# Patient Record
Sex: FEMALE | Race: WHITE | Employment: FULL TIME | URBAN - NONMETROPOLITAN AREA
[De-identification: names, ages, dates, MRNs, and addresses within clinical notes are randomized per-mention and may not be internally consistent; named-entity substitution may affect disease eponyms.]

---

## 2017-03-01 ENCOUNTER — OFFICE VISIT (OUTPATIENT)
Dept: FAMILY MEDICINE CLINIC | Facility: CLINIC | Age: 47
End: 2017-03-01

## 2017-03-01 VITALS
BODY MASS INDEX: 21 KG/M2 | OXYGEN SATURATION: 99 % | WEIGHT: 117 LBS | HEART RATE: 68 BPM | SYSTOLIC BLOOD PRESSURE: 118 MMHG | DIASTOLIC BLOOD PRESSURE: 60 MMHG | TEMPERATURE: 98 F

## 2017-03-01 DIAGNOSIS — J04.10 TRACHEITIS: ICD-10-CM

## 2017-03-01 DIAGNOSIS — J01.00 ACUTE NON-RECURRENT MAXILLARY SINUSITIS: Primary | ICD-10-CM

## 2017-03-01 DIAGNOSIS — H69.83 ETD (EUSTACHIAN TUBE DYSFUNCTION), BILATERAL: ICD-10-CM

## 2017-03-01 PROCEDURE — 99213 OFFICE O/P EST LOW 20 MIN: CPT | Performed by: FAMILY MEDICINE

## 2017-03-01 RX ORDER — AMOXICILLIN AND CLAVULANATE POTASSIUM 875; 125 MG/1; MG/1
1 TABLET, FILM COATED ORAL 2 TIMES DAILY
Qty: 20 TABLET | Refills: 0 | Status: SHIPPED | OUTPATIENT
Start: 2017-03-01 | End: 2017-03-11

## 2017-03-01 RX ORDER — PREDNISONE 20 MG/1
20 TABLET ORAL 2 TIMES DAILY
Qty: 10 TABLET | Refills: 0 | Status: SHIPPED | OUTPATIENT
Start: 2017-03-01 | End: 2017-03-06

## 2017-03-01 RX ORDER — BENZONATATE 200 MG/1
200 CAPSULE ORAL 3 TIMES DAILY PRN
Qty: 15 CAPSULE | Refills: 0 | Status: SHIPPED | OUTPATIENT
Start: 2017-03-01 | End: 2017-05-23 | Stop reason: ALTCHOICE

## 2017-03-01 NOTE — PROGRESS NOTES
HPI:   Manjeet Segura is a 55year old female who presents for upper respiratory symptoms for  5  days. Patient reports sore throat, clear colored nasal discharge, dry cough, cough is keeping pt up at night, denies fever.       No current outpatient prescr Visit:  Signed Prescriptions Disp Refills    predniSONE 20 MG Oral Tab 10 tablet 0      Sig: Take 1 tablet (20 mg total) by mouth 2 (two) times daily.       Amoxicillin-Pot Clavulanate 875-125 MG Oral Tab 20 tablet 0      Sig: Take 1 tablet by mouth 2 (two)

## 2017-03-01 NOTE — PATIENT INSTRUCTIONS
Rest, push fluids, nasal saline ad angel.   Prednisone 20 mg twice daily ×5 days  Augmentin 875 mg twice daily ×10 days with food, side effects discussed  May use Tessalon 200 mg every 8 as needed for refractory cough

## 2017-05-23 ENCOUNTER — OFFICE VISIT (OUTPATIENT)
Dept: FAMILY MEDICINE CLINIC | Facility: CLINIC | Age: 47
End: 2017-05-23

## 2017-05-23 VITALS
BODY MASS INDEX: 21.76 KG/M2 | SYSTOLIC BLOOD PRESSURE: 110 MMHG | TEMPERATURE: 98 F | HEART RATE: 81 BPM | HEIGHT: 62 IN | OXYGEN SATURATION: 99 % | WEIGHT: 118.25 LBS | DIASTOLIC BLOOD PRESSURE: 70 MMHG

## 2017-05-23 DIAGNOSIS — J20.9 BRONCHITIS WITH BRONCHOSPASM: ICD-10-CM

## 2017-05-23 DIAGNOSIS — J01.00 ACUTE NON-RECURRENT MAXILLARY SINUSITIS: Primary | ICD-10-CM

## 2017-05-23 PROCEDURE — 99214 OFFICE O/P EST MOD 30 MIN: CPT | Performed by: FAMILY MEDICINE

## 2017-05-23 RX ORDER — CODEINE PHOSPHATE AND GUAIFENESIN 10; 100 MG/5ML; MG/5ML
SOLUTION ORAL
Qty: 120 ML | Refills: 0 | Status: SHIPPED | OUTPATIENT
Start: 2017-05-23 | End: 2020-04-14 | Stop reason: ALTCHOICE

## 2017-05-23 RX ORDER — BUDESONIDE AND FORMOTEROL FUMARATE DIHYDRATE 160; 4.5 UG/1; UG/1
2 AEROSOL RESPIRATORY (INHALATION) 2 TIMES DAILY
Qty: 1 INHALER | Refills: 0 | COMMUNITY
Start: 2017-05-23 | End: 2020-04-14 | Stop reason: ALTCHOICE

## 2017-05-23 RX ORDER — AZITHROMYCIN 250 MG/1
TABLET, FILM COATED ORAL
Qty: 6 TABLET | Refills: 0 | Status: SHIPPED | OUTPATIENT
Start: 2017-05-23 | End: 2020-04-14 | Stop reason: ALTCHOICE

## 2017-05-23 NOTE — PROGRESS NOTES
Yamilex Baldwin is a 55year old female. Patient presents with: Other: cough-started on 5/18, not sleeping, pain in chest.... has been taking otc robitussin, nto helping, headache. ...room 1      HPI:   Patient is a one-week history of increased cough, con symptomatically. Rest, fluids and Tylenol. Discussed danger signs including increased fever,chills, SOB and need to call if arise. RTC in 24-48 hrs if no improvement or anytime PRN.   Explained majority of upper respiratory infections are viral. Viral in

## 2017-08-30 ENCOUNTER — PATIENT OUTREACH (OUTPATIENT)
Dept: FAMILY MEDICINE CLINIC | Facility: CLINIC | Age: 47
End: 2017-08-30

## 2019-01-07 ENCOUNTER — TELEPHONE (OUTPATIENT)
Dept: FAMILY MEDICINE CLINIC | Facility: CLINIC | Age: 49
End: 2019-01-07

## 2019-01-07 ENCOUNTER — HOSPITAL ENCOUNTER (OUTPATIENT)
Age: 49
Discharge: HOME | End: 2019-01-07
Payer: COMMERCIAL

## 2019-01-07 ENCOUNTER — OFFICE VISIT (OUTPATIENT)
Dept: FAMILY MEDICINE CLINIC | Facility: CLINIC | Age: 49
End: 2019-01-07

## 2019-01-07 VITALS
TEMPERATURE: 99 F | OXYGEN SATURATION: 98 % | HEART RATE: 83 BPM | SYSTOLIC BLOOD PRESSURE: 128 MMHG | RESPIRATION RATE: 16 BRPM | DIASTOLIC BLOOD PRESSURE: 68 MMHG

## 2019-01-07 DIAGNOSIS — Z02.9 ENCOUNTER FOR ADMINISTRATIVE EXAMINATIONS: Primary | ICD-10-CM

## 2019-01-07 DIAGNOSIS — S46.819A STRAIN OF TRAPEZIUS MUSCLE, UNSPECIFIED LATERALITY, INITIAL ENCOUNTER: Primary | ICD-10-CM

## 2019-01-07 PROCEDURE — 99213 OFFICE O/P EST LOW 20 MIN: CPT

## 2019-01-07 PROCEDURE — 99204 OFFICE O/P NEW MOD 45 MIN: CPT

## 2019-01-07 RX ORDER — CYCLOBENZAPRINE HCL 10 MG
10 TABLET ORAL 3 TIMES DAILY PRN
Qty: 20 TABLET | Refills: 0 | Status: SHIPPED | OUTPATIENT
Start: 2019-01-07 | End: 2019-01-14

## 2019-01-07 RX ORDER — NAPROXEN 500 MG/1
500 TABLET ORAL 2 TIMES DAILY PRN
Qty: 20 TABLET | Refills: 0 | Status: SHIPPED | OUTPATIENT
Start: 2019-01-07 | End: 2019-01-14

## 2019-01-07 NOTE — TELEPHONE ENCOUNTER
[address] [facility]. HAS BEEN HAVING BACK AND NECK SPASMS SINCE FRIDAY. SHE SAID SHE CAN'T TAKE THE PAIN ANYMORE.

## 2019-01-07 NOTE — PROGRESS NOTES
Pt presented with back and neck pain, spasms and tingling in fingers. Left work. Pain is unbearable. Has been going on since Friday. Accompanied by: self  After triage, a higher acuity of care was recommended to pt.   Discussed limitations of WI and need

## 2019-01-07 NOTE — ED PROVIDER NOTES
Patient Seen in: [address] [facility]    History   Patient presents with:  Neck Pain (musculoskeletal, neurologic)    Stated Complaint: neck and back pain/fingers tingling    70-year-old female who presents to immediate care with complaints of and vital signs reviewed. All other systems reviewed and negative except as noted above.     Physical Exam     ED Triage Vitals [01/07/19 1448]   /68   Pulse 83   Resp 16   Temp 98.5 °F (36.9 °C)   Temp src Temporal   SpO2 98 %   O2 Device including information regarding today's visit and indications prompting immediate return and appropriate follow-up was given in writing.   Patient and/or family agrees to return for any concerns, problems or complications as discussed and voiced understandi

## 2019-01-07 NOTE — TELEPHONE ENCOUNTER
Patient said that she just got out of the walk in Clinic and she is in so much pain they sent her to [address], [facility] [address] in [place].

## 2019-03-15 ENCOUNTER — PATIENT OUTREACH (OUTPATIENT)
Dept: FAMILY MEDICINE CLINIC | Facility: CLINIC | Age: 49
End: 2019-03-15

## 2019-05-21 ENCOUNTER — TELEPHONE (OUTPATIENT)
Dept: FAMILY MEDICINE CLINIC | Facility: CLINIC | Age: 49
End: 2019-05-21

## 2020-04-13 ENCOUNTER — TELEPHONE (OUTPATIENT)
Dept: FAMILY MEDICINE CLINIC | Facility: CLINIC | Age: 50
End: 2020-04-13

## 2020-04-13 NOTE — TELEPHONE ENCOUNTER
She needs a video visit or at the very least a telephone visit. Aek her what time after 1 she wants to discuss and put her in the schedule.

## 2020-04-13 NOTE — TELEPHONE ENCOUNTER
Patient agrees to a phone visit with Dr Scott Chavez and is aware her insurance will be billed.  Scheduled at 315pm

## 2020-04-13 NOTE — TELEPHONE ENCOUNTER
Was in ER because she hurt herself at work. She had ct scan done. Abnormalities in cervical vertebra, ER  has concern of cancer. Pt Wants to be seen in office by a doctor. She is off today and tomorrow. Wants to be seen this week.  I told her I didn't kn

## 2020-04-13 NOTE — TELEPHONE ENCOUNTER
Pt. Calling to schedule a ER f/u, pt. Was seen at [facility] last Friday. Pt. Refused video visit and did not want to sign up for My Chart. I did inform pt. There would be a charge for the phone visit.  She thinks she needs to come in the office to get f/

## 2020-04-14 ENCOUNTER — TELEPHONE (OUTPATIENT)
Dept: FAMILY MEDICINE CLINIC | Facility: CLINIC | Age: 50
End: 2020-04-14

## 2020-04-14 ENCOUNTER — OFFICE VISIT (OUTPATIENT)
Dept: FAMILY MEDICINE CLINIC | Facility: CLINIC | Age: 50
End: 2020-04-14
Payer: COMMERCIAL

## 2020-04-14 VITALS
TEMPERATURE: 99 F | OXYGEN SATURATION: 97 % | HEIGHT: 62 IN | WEIGHT: 131 LBS | BODY MASS INDEX: 24.11 KG/M2 | DIASTOLIC BLOOD PRESSURE: 76 MMHG | HEART RATE: 76 BPM | SYSTOLIC BLOOD PRESSURE: 116 MMHG

## 2020-04-14 DIAGNOSIS — G89.29 CHRONIC BILATERAL LOW BACK PAIN WITHOUT SCIATICA: ICD-10-CM

## 2020-04-14 DIAGNOSIS — S16.1XXD STRAIN OF NECK MUSCLE, SUBSEQUENT ENCOUNTER: Primary | ICD-10-CM

## 2020-04-14 DIAGNOSIS — R93.89 ABNORMAL CT SCAN, NECK: ICD-10-CM

## 2020-04-14 DIAGNOSIS — Z12.31 ENCOUNTER FOR SCREENING MAMMOGRAM FOR BREAST CANCER: ICD-10-CM

## 2020-04-14 DIAGNOSIS — M54.50 CHRONIC BILATERAL LOW BACK PAIN WITHOUT SCIATICA: ICD-10-CM

## 2020-04-14 PROCEDURE — 99214 OFFICE O/P EST MOD 30 MIN: CPT | Performed by: FAMILY MEDICINE

## 2020-04-14 PROCEDURE — 80053 COMPREHEN METABOLIC PANEL: CPT | Performed by: FAMILY MEDICINE

## 2020-04-14 PROCEDURE — 85025 COMPLETE CBC W/AUTO DIFF WBC: CPT | Performed by: FAMILY MEDICINE

## 2020-04-14 PROCEDURE — 86140 C-REACTIVE PROTEIN: CPT | Performed by: FAMILY MEDICINE

## 2020-04-14 RX ORDER — TRAMADOL HYDROCHLORIDE 50 MG/1
50 TABLET ORAL EVERY 8 HOURS PRN
Qty: 15 TABLET | Refills: 0 | Status: SHIPPED | OUTPATIENT
Start: 2020-04-14 | End: 2020-06-04 | Stop reason: ALTCHOICE

## 2020-04-14 RX ORDER — GABAPENTIN 300 MG/1
300 CAPSULE ORAL
COMMUNITY
Start: 2020-04-10 | End: 2020-04-20

## 2020-04-14 NOTE — PROGRESS NOTES
HPI:    Patient ID: Ellen Looney is a 52year old female. Patient presents with:  ER F/U: neck pain, abnormal CT scan    Patient seen evaluation in hospital emergency room on 4/10/2020 for a cervical strain as well as some low back pain.   A CT scan o Neurological: Positive for headaches ( tension type). Psychiatric/Behavioral: Positive for sleep disturbance ( due to pain).         Current Outpatient Medications   Medication Sig Dispense Refill   • traMADol HCl 50 MG Oral Tab Take 1 tablet (50 mg total Achilles reflexes are 2+ on the right side and 2+ on the left side. Skin: Skin is warm and dry. Psychiatric: She has a normal mood and affect.    Blood pressure 116/76, pulse 76, temperature 98.6 °F (37 °C), temperature source Oral, height 62\", gilberto

## 2020-04-14 NOTE — TELEPHONE ENCOUNTER
Reece Abarca requested to switch providers to Dr. Maryan Mackenzie per Dr. Pam Bass.  Tried calling Reece Abarca twice today to inform her and ask her if she would like to schedule a phone visit-no answer

## 2020-04-14 NOTE — TELEPHONE ENCOUNTER
See below.  said you have a 1pm phone visit with this pt, so I wanted you to have a chance to review phone note.

## 2020-04-14 NOTE — PATIENT INSTRUCTIONS
I reviewed emergency room records as well as imaging reports  I advised patient that it appears her neck pain is related to a cervical strain however further investigation of the cervical spine lucencies is needed.   Would recommend moist heat up to 15 suleman

## 2020-04-15 ENCOUNTER — TELEPHONE (OUTPATIENT)
Dept: FAMILY MEDICINE CLINIC | Facility: CLINIC | Age: 50
End: 2020-04-15

## 2020-04-15 DIAGNOSIS — E87.6 HYPOKALEMIA: Primary | ICD-10-CM

## 2020-04-17 ENCOUNTER — TELEPHONE (OUTPATIENT)
Dept: FAMILY MEDICINE CLINIC | Facility: CLINIC | Age: 50
End: 2020-04-17

## 2020-04-17 DIAGNOSIS — R93.7 ABNORMAL COMPUTED TOMOGRAPHY OF CERVICAL SPINE: Primary | ICD-10-CM

## 2020-04-17 NOTE — TELEPHONE ENCOUNTER
Her insurance only approved a whole body bone scan, this test helps identify problems of bone metabolism and it does not necessarily reveal the reason. It tells you there is a problem and where.   It will  arthritis, AVN, bone cancer, metastatic can

## 2020-04-17 NOTE — TELEPHONE ENCOUNTER
Dr. Kenzie Deluna,    Dr. Julio Araya saw this pt on 4/14. I have been watching for her PET scan referral that you placed on 4/13 to be approved. We were notified today that it was DENIED.   (referral # [id number])    The referral dept gave us this info from the insura

## 2020-04-24 ENCOUNTER — TELEPHONE (OUTPATIENT)
Dept: FAMILY MEDICINE CLINIC | Facility: CLINIC | Age: 50
End: 2020-04-24

## 2020-04-24 DIAGNOSIS — M54.2 NECK PAIN, ACUTE: Primary | ICD-10-CM

## 2020-04-24 RX ORDER — METHOCARBAMOL 500 MG/1
500 TABLET, FILM COATED ORAL 3 TIMES DAILY
Qty: 21 TABLET | Refills: 0 | Status: SHIPPED | OUTPATIENT
Start: 2020-04-24 | End: 2020-05-01

## 2020-04-24 RX ORDER — HYDROCODONE BITARTRATE AND ACETAMINOPHEN 5; 325 MG/1; MG/1
1 TABLET ORAL NIGHTLY PRN
Qty: 7 TABLET | Refills: 0 | Status: SHIPPED | OUTPATIENT
Start: 2020-04-24 | End: 2020-06-04 | Stop reason: ALTCHOICE

## 2020-04-24 NOTE — TELEPHONE ENCOUNTER
Pt's CT has not been approved yet. Pt states tramadol makes her very nauseous and is not working. Patient has a lot of pain in her neck still and doesn't know what to do. Please advise.

## 2020-04-24 NOTE — TELEPHONE ENCOUNTER
The insurance did not approve the CT PET scan, a triple phase bone scan was ordered, and to my knowledge has been approved. Please assist patient in scheduling  Would recommend physical therapy will place order.   Please advise patient that the therapist m

## 2020-04-24 NOTE — TELEPHONE ENCOUNTER
Wants to know if her insurance approved CT? Also Cecilia Cyr states her pain meds giving her nausea and not working.    Would like a call back form the nurse

## 2020-04-24 NOTE — TELEPHONE ENCOUNTER
Pt's bone scan has been approved. Left message with patient providing the central scheduling number. Order and auth sent to central scheduling.

## 2020-04-30 ENCOUNTER — TELEPHONE (OUTPATIENT)
Dept: FAMILY MEDICINE CLINIC | Facility: CLINIC | Age: 50
End: 2020-04-30

## 2020-04-30 ENCOUNTER — LAB ENCOUNTER (OUTPATIENT)
Dept: LAB | Facility: HOSPITAL | Age: 50
End: 2020-04-30
Attending: FAMILY MEDICINE
Payer: COMMERCIAL

## 2020-04-30 ENCOUNTER — VIRTUAL PHONE E/M (OUTPATIENT)
Dept: FAMILY MEDICINE CLINIC | Facility: CLINIC | Age: 50
End: 2020-04-30
Payer: COMMERCIAL

## 2020-04-30 DIAGNOSIS — Z20.822 PERSON UNDER INVESTIGATION FOR COVID-19: Primary | ICD-10-CM

## 2020-04-30 DIAGNOSIS — J02.9 PHARYNGITIS WITH VIRAL SYNDROME: ICD-10-CM

## 2020-04-30 DIAGNOSIS — B34.9 PHARYNGITIS WITH VIRAL SYNDROME: ICD-10-CM

## 2020-04-30 DIAGNOSIS — Z20.822 PERSON UNDER INVESTIGATION FOR COVID-19: ICD-10-CM

## 2020-04-30 PROCEDURE — 99213 OFFICE O/P EST LOW 20 MIN: CPT | Performed by: FAMILY MEDICINE

## 2020-04-30 NOTE — PROGRESS NOTES
Virtual Telephone Check-In    Felix Stafford verbally consents to a Virtual/Telephone Check-In visit on 04/30/20. Patient understands and accepts financial responsibility for any deductible, co-insurance and/or co-pays associated with this service.   Lopez Langley quarantine for at least 7 days following the onset of symptoms or the longer of 3 days after complete resolution of symptoms. I discussed signs and symptoms for progressive illness and management strategies.   I discussed infection control measures as well

## 2020-04-30 NOTE — TELEPHONE ENCOUNTER
Virtual/Telephone Check-In    Jocy Rajput verbally {consents to a [employer] on 04/30/20. Patient understands and accepts financial responsibility for any deductible, co-insurance and/or co-pays associated with this service.   4

## 2020-05-03 ENCOUNTER — TELEPHONE (OUTPATIENT)
Dept: FAMILY MEDICINE CLINIC | Facility: CLINIC | Age: 50
End: 2020-05-03

## 2020-05-03 NOTE — TELEPHONE ENCOUNTER
I reached out to [place] to see how she was feeling. Left a message stating that if she needs anything today she can reach the on-call physician.  I do not have access to the  at our office but she could send a [employer] message and I would be able to see beata

## 2020-05-04 RX ORDER — ALBUTEROL SULFATE 90 UG/1
2 AEROSOL, METERED RESPIRATORY (INHALATION) EVERY 4 HOURS PRN
Qty: 1 INHALER | Refills: 0 | Status: SHIPPED | OUTPATIENT
Start: 2020-05-04 | End: 2020-06-04 | Stop reason: ALTCHOICE

## 2020-05-04 NOTE — TELEPHONE ENCOUNTER
Follow-up call to patient following testing positive for COVID 19 and 4/30/2020. Patient states that she has been afebrile but has developed diarrhea last night with abdominal cramping.   She has a headache some mild chest tightness and a nonproductive cou

## 2020-05-06 ENCOUNTER — TELEPHONE (OUTPATIENT)
Dept: FAMILY MEDICINE CLINIC | Facility: CLINIC | Age: 50
End: 2020-05-06

## 2020-05-06 RX ORDER — AZITHROMYCIN 250 MG/1
TABLET, FILM COATED ORAL
Qty: 6 TABLET | Refills: 0 | Status: SHIPPED | OUTPATIENT
Start: 2020-05-06 | End: 2020-05-11

## 2020-05-06 NOTE — TELEPHONE ENCOUNTER
I called patient to follow-up on her COVID-19 infection. She developed symptoms on 4/26 and tested positive on 4/30. Follow-up 2 days ago noted diarrhea and a dry cough. She denies any recent fever but has had a dry cough, albuterol seems to help.   She

## 2020-05-14 ENCOUNTER — PATIENT MESSAGE (OUTPATIENT)
Dept: FAMILY MEDICINE CLINIC | Facility: CLINIC | Age: 50
End: 2020-05-14

## 2020-05-14 ENCOUNTER — VIRTUAL PHONE E/M (OUTPATIENT)
Dept: FAMILY MEDICINE CLINIC | Facility: CLINIC | Age: 50
End: 2020-05-14
Payer: COMMERCIAL

## 2020-05-14 ENCOUNTER — TELEPHONE (OUTPATIENT)
Dept: FAMILY MEDICINE CLINIC | Facility: CLINIC | Age: 50
End: 2020-05-14

## 2020-05-14 DIAGNOSIS — U07.1 COVID-19 VIRUS INFECTION: Primary | ICD-10-CM

## 2020-05-14 PROCEDURE — 99213 OFFICE O/P EST LOW 20 MIN: CPT | Performed by: FAMILY MEDICINE

## 2020-05-14 NOTE — PROGRESS NOTES
Virtual Telephone Check-In    Alexus Ansari verbally consents to a Virtual/Telephone Check-In visit on 05/14/20. Patient understands and accepts financial responsibility for any deductible, co-insurance and/or co-pays associated with this service.   Floreen Babinski of convalescent plasma in the future. A [employer] message with a link for participation will be forwarded. Sarah Rzao.  Christi Mishra

## 2020-05-14 NOTE — TELEPHONE ENCOUNTER
Virtual/Telephone Check-In    Jocy Rajput verbally {consents to a [employer] on 05/14/20. Patient understands and accepts financial responsibility for any deductible, co-insurance and/or co-pays associated with this service.

## 2020-05-28 ENCOUNTER — HOSPITAL ENCOUNTER (OUTPATIENT)
Dept: NUCLEAR MEDICINE | Facility: HOSPITAL | Age: 50
Discharge: HOME | End: 2020-05-28
Attending: INTERNAL MEDICINE
Payer: COMMERCIAL

## 2020-05-28 DIAGNOSIS — R93.7 ABNORMAL COMPUTED TOMOGRAPHY OF CERVICAL SPINE: ICD-10-CM

## 2020-05-28 PROCEDURE — 78306 BONE IMAGING WHOLE BODY: CPT | Performed by: INTERNAL MEDICINE

## 2020-05-28 PROCEDURE — 78832 RP LOCLZJ TUM SPECT W/CT 2: CPT | Performed by: INTERNAL MEDICINE

## 2020-06-04 ENCOUNTER — TELEPHONE (OUTPATIENT)
Dept: FAMILY MEDICINE CLINIC | Facility: CLINIC | Age: 50
End: 2020-06-04

## 2020-06-04 ENCOUNTER — VIRTUAL PHONE E/M (OUTPATIENT)
Dept: FAMILY MEDICINE CLINIC | Facility: CLINIC | Age: 50
End: 2020-06-04
Payer: COMMERCIAL

## 2020-06-04 DIAGNOSIS — F51.04 PSYCHOPHYSIOLOGICAL INSOMNIA: ICD-10-CM

## 2020-06-04 DIAGNOSIS — R93.7 ABNORMAL COMPUTED TOMOGRAPHY OF CERVICAL SPINE: Primary | ICD-10-CM

## 2020-06-04 DIAGNOSIS — R20.2 TINGLING OF BOTH FEET: ICD-10-CM

## 2020-06-04 PROCEDURE — 99213 OFFICE O/P EST LOW 20 MIN: CPT | Performed by: FAMILY MEDICINE

## 2020-06-04 RX ORDER — TRAZODONE HYDROCHLORIDE 50 MG/1
TABLET ORAL
Qty: 30 TABLET | Refills: 2 | Status: SHIPPED | OUTPATIENT
Start: 2020-06-04 | End: 2021-12-07

## 2020-06-04 NOTE — PROGRESS NOTES
Virtual Telephone Check-In    Darryl Meng verbally consents to a Virtual/Telephone Check-In visit on 06/04/20. Patient has been referred to the [facility] website at [email or web address] to review the yearly Consent to Treat document.   The following vis in both feet especially when she has been on her feet for prolonged period of time. She has been working long hours as a dialysis tech.   She also states yesterday she had mild tingling in one finger which is subsequently resolved    Patient also requests

## 2020-09-29 ENCOUNTER — TELEMEDICINE (OUTPATIENT)
Dept: FAMILY MEDICINE CLINIC | Facility: CLINIC | Age: 50
End: 2020-09-29
Payer: COMMERCIAL

## 2020-09-29 ENCOUNTER — TELEPHONE (OUTPATIENT)
Dept: FAMILY MEDICINE CLINIC | Facility: CLINIC | Age: 50
End: 2020-09-29

## 2020-09-29 DIAGNOSIS — Z86.16 HISTORY OF 2019 NOVEL CORONAVIRUS DISEASE (COVID-19): ICD-10-CM

## 2020-09-29 DIAGNOSIS — B34.9 VIRAL ILLNESS: Primary | ICD-10-CM

## 2020-09-29 PROCEDURE — 99213 OFFICE O/P EST LOW 20 MIN: CPT | Performed by: FAMILY MEDICINE

## 2020-09-29 NOTE — TELEPHONE ENCOUNTER
Virtual/Telephone Check-In    Danni Mata verbally {consents to a [employer] on 09/29/20. Patient has been referred to the [facility] website at [email or web address] to review the yearly Consent to Treat document.   Patient unders

## 2020-09-29 NOTE — PATIENT INSTRUCTIONS
I again reviewed signs and symptoms of COVID-19 infection, as it is been almost 5 months since her initial infection, would recommend she get retested. Order placed in the [facility] system.   Patient will call for lab appointment  I reviewed current CDC guide

## 2020-09-29 NOTE — PROGRESS NOTES
Yamilex Baldwin is a 52year old female. Telehealth outside of [facility] [address] Verbal Consent   I conducted a telehealth visit with Yamilex Baldwin today, 09/29/20, which was completed using two-way, real-time interactive audio and video communication. breath or fever.   She denies any change in sense of taste or smell or bowel movement    Current Outpatient Medications   Medication Sig Dispense Refill   • traZODone HCl 50 MG Oral Tab 25 to 50 mg nightly 30 tablet 2      Past Medical History:   Diagnosis COVID-19 testing. I suspect the earliest she will be able to return to work is 10/5/2020. The patient indicates understanding of these issues and agrees to the plan. Lore Portillo.  Flynn Munoz, BLACKFP

## 2020-09-30 ENCOUNTER — APPOINTMENT (OUTPATIENT)
Dept: LAB | Age: 50
End: 2020-09-30
Attending: FAMILY MEDICINE
Payer: COMMERCIAL

## 2020-09-30 DIAGNOSIS — Z86.16 HISTORY OF 2019 NOVEL CORONAVIRUS DISEASE (COVID-19): ICD-10-CM

## 2020-09-30 DIAGNOSIS — B34.9 VIRAL ILLNESS: ICD-10-CM

## 2021-07-23 ENCOUNTER — OFFICE VISIT (OUTPATIENT)
Dept: FAMILY MEDICINE CLINIC | Facility: CLINIC | Age: 51
End: 2021-07-23
Payer: COMMERCIAL

## 2021-07-23 VITALS
DIASTOLIC BLOOD PRESSURE: 78 MMHG | HEART RATE: 67 BPM | TEMPERATURE: 99 F | SYSTOLIC BLOOD PRESSURE: 118 MMHG | RESPIRATION RATE: 16 BRPM | WEIGHT: 121 LBS | HEIGHT: 62 IN | BODY MASS INDEX: 22.26 KG/M2 | OXYGEN SATURATION: 99 %

## 2021-07-23 DIAGNOSIS — B37.3 CANDIDA VAGINITIS: Primary | ICD-10-CM

## 2021-07-23 PROBLEM — Z86.16 HISTORY OF 2019 NOVEL CORONAVIRUS DISEASE (COVID-19): Status: ACTIVE | Noted: 2020-04-30

## 2021-07-23 PROCEDURE — 3008F BODY MASS INDEX DOCD: CPT | Performed by: FAMILY MEDICINE

## 2021-07-23 PROCEDURE — 3078F DIAST BP <80 MM HG: CPT | Performed by: FAMILY MEDICINE

## 2021-07-23 PROCEDURE — 3074F SYST BP LT 130 MM HG: CPT | Performed by: FAMILY MEDICINE

## 2021-07-23 PROCEDURE — 99213 OFFICE O/P EST LOW 20 MIN: CPT | Performed by: FAMILY MEDICINE

## 2021-07-23 RX ORDER — FLUCONAZOLE 150 MG/1
150 TABLET ORAL DAILY
Qty: 2 TABLET | Refills: 0 | Status: SHIPPED | OUTPATIENT
Start: 2021-07-23 | End: 2021-07-25

## 2021-07-23 NOTE — PROGRESS NOTES
HPI/Subjective:   Meena Smith is a 48year old female who presents for Vaginal Problem (Has had vaginal itching for 2 weeks )     Patient here for evaluation for vaginal itching for 2 weeks she notices no specific discharge neither white nor clear, she

## 2021-08-02 ENCOUNTER — TELEPHONE (OUTPATIENT)
Dept: FAMILY MEDICINE CLINIC | Facility: CLINIC | Age: 51
End: 2021-08-02

## 2021-08-02 RX ORDER — FLUCONAZOLE 100 MG/1
100 TABLET ORAL DAILY
Qty: 7 TABLET | Refills: 0 | Status: SHIPPED | OUTPATIENT
Start: 2021-08-02 | End: 2021-08-09

## 2021-08-02 NOTE — TELEPHONE ENCOUNTER
Pt saw you on 7/23/21 for Candida vaginitis. Spoke with pt. Pt states she still feeling itchiness (comes and goes). She also feels a burning sensation from time to time. Pt is still using Vagisil to help the itching with some relief.  Pt states she was

## 2021-08-16 ENCOUNTER — TELEPHONE (OUTPATIENT)
Dept: FAMILY MEDICINE CLINIC | Facility: CLINIC | Age: 51
End: 2021-08-16

## 2021-08-16 DIAGNOSIS — N76.1 SUBACUTE VAGINITIS: Primary | ICD-10-CM

## 2021-08-16 RX ORDER — METRONIDAZOLE 500 MG/1
500 TABLET ORAL 2 TIMES DAILY
Qty: 20 TABLET | Refills: 0 | Status: SHIPPED | OUTPATIENT
Start: 2021-08-16 | End: 2021-08-26

## 2021-08-16 NOTE — TELEPHONE ENCOUNTER
From: Rodriguez Barker  To: Miya Christian MD  Sent: 1/28/2019 11:01 AM CST  Subject: Visit Follow-up Question    This message is being sent by Amita Loya on behalf of Rodriguez Barker    Update on carlin:   We saw the cardiologist Thursday and he was It  s possibe she has   Bacterial vaginosis and not candida  So try    Metronidazole    See prescription below--I sent to pharmacy

## 2021-09-09 ENCOUNTER — TELEPHONE (OUTPATIENT)
Dept: FAMILY MEDICINE CLINIC | Facility: CLINIC | Age: 51
End: 2021-09-09

## 2021-09-09 NOTE — TELEPHONE ENCOUNTER
HAVING SICK SYMPTOMS, NO OPENINGS IN RESP CLINIC UNTIL MONDAY, PT NEEDS TO BE SEEN BEFORE THEN SO SHE CAN GO BACK TO WORK MONDAY, CALL PT

## 2021-09-09 NOTE — TELEPHONE ENCOUNTER
Pt states her employer tested her for Covid yesterday---she is waiting for the results. States her employer told her she should still be seen/ evaluated by a physician though.   Pt will go to [facility] or  this evening or tomorrow

## 2021-11-05 ENCOUNTER — TELEPHONE (OUTPATIENT)
Dept: FAMILY MEDICINE CLINIC | Facility: CLINIC | Age: 51
End: 2021-11-05

## 2021-11-05 NOTE — TELEPHONE ENCOUNTER
Spoke with patient who states she is having severe vaginal itching. She also has white spots that look like they are about to burst. She is extremely uncomfortable.  Spoke with Lisseth Ramsey, who states she can be seen tomorrow in office for a 40 minute

## 2021-11-06 ENCOUNTER — OFFICE VISIT (OUTPATIENT)
Dept: FAMILY MEDICINE CLINIC | Facility: CLINIC | Age: 51
End: 2021-11-06
Payer: COMMERCIAL

## 2021-11-06 VITALS
RESPIRATION RATE: 16 BRPM | OXYGEN SATURATION: 99 % | HEART RATE: 68 BPM | DIASTOLIC BLOOD PRESSURE: 68 MMHG | BODY MASS INDEX: 22.26 KG/M2 | WEIGHT: 121 LBS | HEIGHT: 62 IN | TEMPERATURE: 98 F | SYSTOLIC BLOOD PRESSURE: 102 MMHG

## 2021-11-06 DIAGNOSIS — Z01.419 ENCOUNTER FOR ANNUAL ROUTINE GYNECOLOGICAL EXAMINATION: Primary | ICD-10-CM

## 2021-11-06 DIAGNOSIS — A63.0 CONDYLOMA ACUMINATA: ICD-10-CM

## 2021-11-06 DIAGNOSIS — N84.1 CERVICAL POLYP: ICD-10-CM

## 2021-11-06 DIAGNOSIS — Z11.3 SCREENING FOR STD (SEXUALLY TRANSMITTED DISEASE): ICD-10-CM

## 2021-11-06 PROCEDURE — 87624 HPV HI-RISK TYP POOLED RSLT: CPT | Performed by: NURSE PRACTITIONER

## 2021-11-06 PROCEDURE — 3078F DIAST BP <80 MM HG: CPT | Performed by: NURSE PRACTITIONER

## 2021-11-06 PROCEDURE — 87591 N.GONORRHOEAE DNA AMP PROB: CPT | Performed by: NURSE PRACTITIONER

## 2021-11-06 PROCEDURE — 87491 CHLMYD TRACH DNA AMP PROBE: CPT | Performed by: NURSE PRACTITIONER

## 2021-11-06 PROCEDURE — 99214 OFFICE O/P EST MOD 30 MIN: CPT | Performed by: NURSE PRACTITIONER

## 2021-11-06 PROCEDURE — 3008F BODY MASS INDEX DOCD: CPT | Performed by: NURSE PRACTITIONER

## 2021-11-06 PROCEDURE — 87389 HIV-1 AG W/HIV-1&-2 AB AG IA: CPT | Performed by: NURSE PRACTITIONER

## 2021-11-06 PROCEDURE — 88175 CYTOPATH C/V AUTO FLUID REDO: CPT | Performed by: NURSE PRACTITIONER

## 2021-11-06 PROCEDURE — 3074F SYST BP LT 130 MM HG: CPT | Performed by: NURSE PRACTITIONER

## 2021-11-06 PROCEDURE — 86780 TREPONEMA PALLIDUM: CPT | Performed by: NURSE PRACTITIONER

## 2021-11-06 PROCEDURE — 87340 HEPATITIS B SURFACE AG IA: CPT | Performed by: NURSE PRACTITIONER

## 2021-11-06 NOTE — PROGRESS NOTES
HPI: HPI   Patient is here for vaginal itching. Has been present for 2-3 months. Tried antifungal treatment and it did not help. No unusual discharge, bleeding or odor. No UTI sx. No change in bowel movements. Not currently sexually active.  Last STD scr Right: Lesion present. Left: Lesion present. Vagina: Normal.      Cervix: Friability present. No discharge.       Uterus: Normal.       Adnexa: Right adnexa normal and left adnexa normal.                Comments: Flesh-colored, non-tender wart

## 2021-11-06 NOTE — PATIENT INSTRUCTIONS
Genital Warts  Genital warts are painless skin bumps in your genital area. You may have a single wart or several grouped together. They can have a flat or rough surface. Genital warts can appear on the penis, scrotum, vagina, vulva, or anus.    Genital wa virus remains in your skin. The warts may reappear. Be sure to let your healthcare provider know if there's any chance that you could be pregnant before starting treatment. You can get an HPV vaccine.  The vaccine protects against certain types of HPV beata have regular Pap test.   Follow-up care  When you first learn that you have genital warts, you may feel guilty, angry, and upset. Getting the facts helps put you back in control.  Follow up with your healthcare provider or your local public health departmen

## 2021-12-07 ENCOUNTER — OFFICE VISIT (OUTPATIENT)
Dept: OBGYN CLINIC | Facility: CLINIC | Age: 51
End: 2021-12-07
Payer: COMMERCIAL

## 2021-12-07 VITALS
SYSTOLIC BLOOD PRESSURE: 90 MMHG | BODY MASS INDEX: 23.07 KG/M2 | HEIGHT: 62 IN | DIASTOLIC BLOOD PRESSURE: 62 MMHG | WEIGHT: 125.38 LBS | HEART RATE: 80 BPM

## 2021-12-07 DIAGNOSIS — A63.0 CONDYLOMA ACUMINATA: Primary | ICD-10-CM

## 2021-12-07 DIAGNOSIS — Z23 NEED FOR VACCINATION: ICD-10-CM

## 2021-12-07 PROCEDURE — 3008F BODY MASS INDEX DOCD: CPT | Performed by: OBSTETRICS & GYNECOLOGY

## 2021-12-07 PROCEDURE — 90471 IMMUNIZATION ADMIN: CPT | Performed by: OBSTETRICS & GYNECOLOGY

## 2021-12-07 PROCEDURE — 3074F SYST BP LT 130 MM HG: CPT | Performed by: OBSTETRICS & GYNECOLOGY

## 2021-12-07 PROCEDURE — 3078F DIAST BP <80 MM HG: CPT | Performed by: OBSTETRICS & GYNECOLOGY

## 2021-12-07 PROCEDURE — 99243 OFF/OP CNSLTJ NEW/EST LOW 30: CPT | Performed by: OBSTETRICS & GYNECOLOGY

## 2021-12-07 PROCEDURE — 90686 IIV4 VACC NO PRSV 0.5 ML IM: CPT | Performed by: OBSTETRICS & GYNECOLOGY

## 2021-12-07 RX ORDER — IMIQUIMOD 12.5 MG/.25G
1 CREAM TOPICAL
Qty: 24 EACH | Refills: 2 | Status: SHIPPED | OUTPATIENT
Start: 2021-12-08 | End: 2021-12-28

## 2021-12-07 NOTE — PROGRESS NOTES
[facility]  Obstetrics and Gynecology  Consultation History & Physical    Sindy Gasmen [facility] Patient Status:  No patient class for patient encounter    10/9/1970 MRN [id number]   Location [address] [place] [address], [address] [place] [address] Drive, [address] [facility]  Attend normal   Vagina- No vaginal lesions, no discharge   Cervix- No lesions, long/closed, no cervical motion tenderness  Extremities: Non-tender, full range of motion, no clubbing, cyanosis or edema  Skin:  General inspection- no rashes, lesions or discoloratio

## 2021-12-07 NOTE — PATIENT INSTRUCTIONS
Aldara (Imiquimod) Instructions    Apply a thin layer 3 times/week on alternative days prior to bedtime and leave on skin for 6-10 hours. Remove by washing with mild soap and water.  Continue imiquimod treatment until there is total clearance of the lesion

## 2021-12-09 ENCOUNTER — TELEPHONE (OUTPATIENT)
Dept: OBGYN CLINIC | Facility: CLINIC | Age: 51
End: 2021-12-09

## 2021-12-10 ENCOUNTER — TELEPHONE (OUTPATIENT)
Dept: OBGYN CLINIC | Facility: CLINIC | Age: 51
End: 2021-12-10

## 2021-12-10 DIAGNOSIS — A63.0 CONDYLOMA ACUMINATA: Primary | ICD-10-CM

## 2021-12-10 NOTE — TELEPHONE ENCOUNTER
----- Message from Rachelle Clayton MD sent at 12/7/2021 12:55 PM CST -----  Surgeon: Dr. Rachelle Clayton    Date:     Assistant: theresa    Type of Admit/Expected Discharge Department: Outpatient/Same Day    LOS: 0    Procedure Location: Main OR    PreOp Dx: Faviola Archer

## 2021-12-10 NOTE — TELEPHONE ENCOUNTER
Surgery schedule for 1/13/22 at 230  Pre/post op   Future Appointments   Date Time Provider Denise Ramirezi   12/28/2021 11:15 AM Amisha Calzada MD [facility] [address]   1/25/2022 11:30 AM Amisha Calzada MD [facility] [address]     Orders enter

## 2021-12-28 ENCOUNTER — OFFICE VISIT (OUTPATIENT)
Dept: OBGYN CLINIC | Facility: CLINIC | Age: 51
End: 2021-12-28
Payer: COMMERCIAL

## 2021-12-28 VITALS
SYSTOLIC BLOOD PRESSURE: 108 MMHG | DIASTOLIC BLOOD PRESSURE: 70 MMHG | WEIGHT: 124 LBS | BODY MASS INDEX: 22.82 KG/M2 | HEIGHT: 62 IN

## 2021-12-28 DIAGNOSIS — N90.89 VULVAR LESION: ICD-10-CM

## 2021-12-28 DIAGNOSIS — A63.0 CONDYLOMA ACUMINATUM OF VULVA: Primary | ICD-10-CM

## 2021-12-28 DIAGNOSIS — Z12.31 VISIT FOR SCREENING MAMMOGRAM: ICD-10-CM

## 2021-12-28 PROCEDURE — 99213 OFFICE O/P EST LOW 20 MIN: CPT | Performed by: OBSTETRICS & GYNECOLOGY

## 2021-12-28 PROCEDURE — 87529 HSV DNA AMP PROBE: CPT | Performed by: OBSTETRICS & GYNECOLOGY

## 2021-12-28 PROCEDURE — 3074F SYST BP LT 130 MM HG: CPT | Performed by: OBSTETRICS & GYNECOLOGY

## 2021-12-28 PROCEDURE — 3008F BODY MASS INDEX DOCD: CPT | Performed by: OBSTETRICS & GYNECOLOGY

## 2021-12-28 PROCEDURE — 3078F DIAST BP <80 MM HG: CPT | Performed by: OBSTETRICS & GYNECOLOGY

## 2021-12-28 RX ORDER — VALACYCLOVIR HYDROCHLORIDE 500 MG/1
1000 TABLET, FILM COATED ORAL DAILY
Qty: 14 TABLET | Refills: 0 | Status: SHIPPED | OUTPATIENT
Start: 2021-12-28 | End: 2022-01-04

## 2021-12-28 NOTE — PROGRESS NOTES
Subjective:  46year old    Patient presents with: Other: preop    Pre operative consultation for treatment condyloma. Also three day history of pain and itching on left side of vulva. Review of Systems:  Pertinent items are noted in the HPI.

## 2021-12-29 ENCOUNTER — TELEPHONE (OUTPATIENT)
Dept: OBGYN CLINIC | Facility: CLINIC | Age: 51
End: 2021-12-29

## 2021-12-29 DIAGNOSIS — A60.04 HERPES SIMPLEX VULVOVAGINITIS: Primary | ICD-10-CM

## 2021-12-29 DIAGNOSIS — Z20.822 ENCOUNTER FOR PREOPERATIVE SCREENING LABORATORY TESTING FOR COVID-19 VIRUS: Primary | ICD-10-CM

## 2021-12-29 DIAGNOSIS — Z01.812 ENCOUNTER FOR PREOPERATIVE SCREENING LABORATORY TESTING FOR COVID-19 VIRUS: Primary | ICD-10-CM

## 2021-12-29 RX ORDER — VALACYCLOVIR HYDROCHLORIDE 500 MG/1
500 TABLET, FILM COATED ORAL DAILY
Qty: 90 TABLET | Refills: 3 | Status: SHIPPED | OUTPATIENT
Start: 2021-12-29

## 2021-12-29 NOTE — PROGRESS NOTES
Test result consistent with genital HSV. Patient already counseled. Recommend suppressive therapy. Rx sent.

## 2021-12-29 NOTE — TELEPHONE ENCOUNTER
Patient needs orders for covid test for upcoming surgery. Please enter and central scheduling will call the patient to get her scheduled.

## 2022-01-04 ENCOUNTER — TELEPHONE (OUTPATIENT)
Dept: OBGYN CLINIC | Facility: CLINIC | Age: 52
End: 2022-01-04

## 2022-01-04 NOTE — TELEPHONE ENCOUNTER
Patient is out of the valtrex. She is still having itching not sure if she needs a refill on it or needs something else prescribed. Has surgery scheduled for next week.

## 2022-01-04 NOTE — TELEPHONE ENCOUNTER
46year old patient complaining of continued vulvar itching. She has been using vagisil cream which helps a little bit, but it's still quite bothersome. She also needs a refill on Valtrex. Last OV date: 12/28/21 with Dr. Brandon Benjamin for pre op- surgery scheduled for 1/13/22 for treatment of condyloma  Recent Test/Labs: 12/28/21 + HSV type 2   Recommendations: advised that new rx with refills for Valtrex was sent on 12/29 so that should be available for her at her pharmacy. Will consult with Dr. Brandon Benjamin to see if there are any other recommendations for symptom management until her surgery. Patient understands that she may not receive call back until 1/5.

## 2022-01-07 ENCOUNTER — TELEPHONE (OUTPATIENT)
Dept: OBGYN CLINIC | Facility: CLINIC | Age: 52
End: 2022-01-07

## 2022-01-07 RX ORDER — IMIQUIMOD 12.5 MG/.25G
1 CREAM TOPICAL
Qty: 24 EACH | Refills: 1 | Status: SHIPPED | OUTPATIENT
Start: 2022-01-07

## 2022-01-18 NOTE — TELEPHONE ENCOUNTER
Pt called to notify that the pharmacy wont release her Imiquimod cream because its too early. (due on 1/29/22) pt states she needs it now as she is using as  directed and has run out. Can a new rx be sent to pharmacy?   Juana Brothjustice

## 2022-01-20 NOTE — TELEPHONE ENCOUNTER
Pharmacy contacted. Insurance will not release early, but patient can pay out of pocket if desired. Message left for patient.

## 2022-03-09 ENCOUNTER — TELEPHONE (OUTPATIENT)
Dept: FAMILY MEDICINE CLINIC | Facility: CLINIC | Age: 52
End: 2022-03-09

## 2022-03-09 NOTE — TELEPHONE ENCOUNTER
Left message for patient to call. Has an order for a mammogram that has  not been completed. Does she need the number for Central Scheduling?

## 2022-06-07 ENCOUNTER — TELEPHONE (OUTPATIENT)
Dept: OBGYN CLINIC | Facility: CLINIC | Age: 52
End: 2022-06-07

## 2022-06-08 NOTE — TELEPHONE ENCOUNTER
Last OV: 12/28/21 with Dr. Brandon Benjamin for pre-op for treatment of condylomas. Surgery was unable to be performed and Imiquimod started. Patient was to f/u in office in 1 month----> February 2022      Call to patient; no answer. Left message to call back.

## 2022-06-13 DIAGNOSIS — A63.0 CONDYLOMA ACUMINATUM OF VULVA: Primary | ICD-10-CM

## 2022-06-13 NOTE — TELEPHONE ENCOUNTER
Patient states her lesions are still there but are smaller  She would like to have a refill to see if it helps prior to scheduling an appointment with another provider  If the lesions do not improve, she will call to provide an update and request a referral to the provider who can do laser surgery. Dr. Robby White, okay for refill?

## 2022-06-14 RX ORDER — IMIQUIMOD 12.5 MG/.25G
CREAM TOPICAL
Qty: 24 EACH | Refills: 0 | Status: SHIPPED | OUTPATIENT
Start: 2022-06-14

## 2022-06-15 NOTE — TELEPHONE ENCOUNTER
Patient used medication for 16 weeks. Patient reports 60-70% improvement. Refill sent. Follow up 1-2 mos if not completely resolved.

## 2022-07-05 ENCOUNTER — TELEPHONE (OUTPATIENT)
Dept: OBGYN CLINIC | Facility: CLINIC | Age: 52
End: 2022-07-05

## 2022-07-05 NOTE — TELEPHONE ENCOUNTER
Contacted patient. She believes she is having an HSV outbreak. On suppression therapy and wants to know if she should increase the dose. Provided instructions for outbreak- 500 mg bid for 3 days. Patient states understanding.
Pt calling states that she is having reoccurring symptoms. Really irritated.     Pt pharmacy is [facility] in [place]
Physical therapy

## 2022-07-08 ENCOUNTER — TELEPHONE (OUTPATIENT)
Dept: OBGYN CLINIC | Facility: CLINIC | Age: 52
End: 2022-07-08

## 2022-07-08 NOTE — TELEPHONE ENCOUNTER
Spoke with patient on Tuesday. She had called about how to treat outbreak of HSV. Instructions were given. Returned call to patient. She did three days of treatment for outbreak, but feels it is not helping. She has been using vagisil cream to help with the itching, but it hasn't been enough. She's having a lot of itching and pain. Denies abnormal discharge. Will consult with Dr. Jose Villareal and call patient back.

## 2022-07-08 NOTE — TELEPHONE ENCOUNTER
Was off medication completely for one week. Took 500 mg twice daily for three days. Last dose this morning. Ok to continue 500 mg BID for an additional 2 days, then back to 500 mg daily suppressive. Can increase to 1000 mg daily suppressive if still getting outbreaks. If no resolution by next week follow up office.   If symptoms worsen, recommend urgent care

## 2022-07-08 NOTE — TELEPHONE ENCOUNTER
PT was on Valtrex before and stopped taking because she could not afford it.     She has been taking again for the past week and is not feeling any better    Please call

## 2022-09-29 ENCOUNTER — APPOINTMENT (OUTPATIENT)
Dept: MRI IMAGING | Facility: HOSPITAL | Age: 52
End: 2022-09-29
Attending: STUDENT IN AN ORGANIZED HEALTH CARE EDUCATION/TRAINING PROGRAM
Payer: COMMERCIAL

## 2022-09-29 ENCOUNTER — HOSPITAL ENCOUNTER (INPATIENT)
Facility: HOSPITAL | Age: 52
LOS: 3 days | Discharge: HOME | End: 2022-10-02
Attending: STUDENT IN AN ORGANIZED HEALTH CARE EDUCATION/TRAINING PROGRAM | Admitting: INTERNAL MEDICINE
Payer: COMMERCIAL

## 2022-09-29 ENCOUNTER — TELEPHONE (OUTPATIENT)
Dept: FAMILY MEDICINE CLINIC | Facility: CLINIC | Age: 52
End: 2022-09-29

## 2022-09-29 DIAGNOSIS — G51.0 FACIAL PARALYSIS: Primary | ICD-10-CM

## 2022-09-29 PROBLEM — D64.9 ANEMIA: Status: ACTIVE | Noted: 2022-09-29

## 2022-09-29 PROBLEM — R79.89 AZOTEMIA: Status: ACTIVE | Noted: 2022-09-29

## 2022-09-29 LAB
ALBUMIN SERPL-MCNC: 3.9 G/DL (ref 3.4–5)
ALBUMIN/GLOB SERPL: 0.9 {RATIO} (ref 1–2)
ALP LIVER SERPL-CCNC: 55 U/L
ALT SERPL-CCNC: 14 U/L
ANION GAP SERPL CALC-SCNC: 5 MMOL/L (ref 0–18)
APTT PPP: 34.7 SECONDS (ref 23.3–35.6)
AST SERPL-CCNC: 18 U/L (ref 15–37)
BASOPHILS # BLD AUTO: 0.05 X10(3) UL (ref 0–0.2)
BASOPHILS NFR BLD AUTO: 0.6 %
BILIRUB SERPL-MCNC: 0.4 MG/DL (ref 0.1–2)
BUN BLD-MCNC: 12 MG/DL (ref 7–18)
CALCIUM BLD-MCNC: 9.1 MG/DL (ref 8.5–10.1)
CHLORIDE SERPL-SCNC: 107 MMOL/L (ref 98–112)
CO2 SERPL-SCNC: 25 MMOL/L (ref 21–32)
CREAT BLD-MCNC: 0.59 MG/DL
EOSINOPHIL # BLD AUTO: 0.22 X10(3) UL (ref 0–0.7)
EOSINOPHIL NFR BLD AUTO: 2.8 %
ERYTHROCYTE [DISTWIDTH] IN BLOOD BY AUTOMATED COUNT: 15.2 %
GFR SERPLBLD BASED ON 1.73 SQ M-ARVRAT: 109 ML/MIN/1.73M2 (ref 60–?)
GLOBULIN PLAS-MCNC: 4.5 G/DL (ref 2.8–4.4)
GLUCOSE BLD-MCNC: 84 MG/DL (ref 70–99)
HCT VFR BLD AUTO: 37.3 %
HGB BLD-MCNC: 11.6 G/DL
IMM GRANULOCYTES # BLD AUTO: 0.01 X10(3) UL (ref 0–1)
IMM GRANULOCYTES NFR BLD: 0.1 %
INR BLD: 1.23 (ref 0.85–1.16)
LYMPHOCYTES # BLD AUTO: 2.27 X10(3) UL (ref 1–4)
LYMPHOCYTES NFR BLD AUTO: 28.4 %
MCH RBC QN AUTO: 28 PG (ref 26–34)
MCHC RBC AUTO-ENTMCNC: 31.1 G/DL (ref 31–37)
MCV RBC AUTO: 89.9 FL
MONOCYTES # BLD AUTO: 0.54 X10(3) UL (ref 0.1–1)
MONOCYTES NFR BLD AUTO: 6.8 %
NEUTROPHILS # BLD AUTO: 4.89 X10 (3) UL (ref 1.5–7.7)
NEUTROPHILS # BLD AUTO: 4.89 X10(3) UL (ref 1.5–7.7)
NEUTROPHILS NFR BLD AUTO: 61.3 %
OSMOLALITY SERPL CALC.SUM OF ELEC: 283 MOSM/KG (ref 275–295)
PLATELET # BLD AUTO: 262 10(3)UL (ref 150–450)
POTASSIUM SERPL-SCNC: 3.6 MMOL/L (ref 3.5–5.1)
PROT SERPL-MCNC: 8.4 G/DL (ref 6.4–8.2)
PROTHROMBIN TIME: 15.5 SECONDS (ref 11.6–14.8)
RBC # BLD AUTO: 4.15 X10(6)UL
SARS-COV-2 RNA RESP QL NAA+PROBE: NOT DETECTED
SODIUM SERPL-SCNC: 137 MMOL/L (ref 136–145)
WBC # BLD AUTO: 8 X10(3) UL (ref 4–11)

## 2022-09-29 PROCEDURE — 70546 MR ANGIOGRAPH HEAD W/O&W/DYE: CPT | Performed by: STUDENT IN AN ORGANIZED HEALTH CARE EDUCATION/TRAINING PROGRAM

## 2022-09-29 PROCEDURE — 99222 1ST HOSP IP/OBS MODERATE 55: CPT | Performed by: INTERNAL MEDICINE

## 2022-09-29 PROCEDURE — 70549 MR ANGIOGRAPH NECK W/O&W/DYE: CPT | Performed by: STUDENT IN AN ORGANIZED HEALTH CARE EDUCATION/TRAINING PROGRAM

## 2022-09-29 PROCEDURE — 70553 MRI BRAIN STEM W/O & W/DYE: CPT | Performed by: STUDENT IN AN ORGANIZED HEALTH CARE EDUCATION/TRAINING PROGRAM

## 2022-09-29 RX ORDER — ONDANSETRON 2 MG/ML
4 INJECTION INTRAMUSCULAR; INTRAVENOUS EVERY 6 HOURS PRN
Status: DISCONTINUED | OUTPATIENT
Start: 2022-09-29 | End: 2022-10-02

## 2022-09-29 RX ORDER — VALACYCLOVIR HYDROCHLORIDE 500 MG/1
500 TABLET, FILM COATED ORAL DAILY
COMMUNITY
Start: 2022-06-28 | End: 2022-10-03

## 2022-09-29 RX ORDER — VALACYCLOVIR HYDROCHLORIDE 500 MG/1
500 TABLET, FILM COATED ORAL NIGHTLY
Status: DISCONTINUED | OUTPATIENT
Start: 2022-09-29 | End: 2022-10-02

## 2022-09-29 RX ORDER — ACETAMINOPHEN 500 MG
500 TABLET ORAL EVERY 4 HOURS PRN
Status: DISCONTINUED | OUTPATIENT
Start: 2022-09-29 | End: 2022-10-01

## 2022-09-29 NOTE — TELEPHONE ENCOUNTER
Spoke with pt. States she woke up this morning and the R side of her mouth and face are swollen and numb. States her \"smile is crooked\". Also c/o h/a. Denies numbness or weakness in extremities. Reports she drove to work and they sent her home. She is currently in [place], [place] driving back to her home in [place]. Advised she needs to be evaluated in ER. Recommended [facility] since she is closer to that location right now, rather to  which is closer to her home.

## 2022-09-29 NOTE — ED INITIAL ASSESSMENT (HPI)
Patient states she went to bed around 9PM last night feeling fine. She woke up at [facility] for work and felt her face was feeling weird. Patient went to bathroom and noticed her whole right side of face was drooping and numb. Patient states her right side of her face, right side of neck and down right arm to fingers are numb and tingling. Patient in triage has NIH 1. Patients only other complaint is a headache.

## 2022-09-29 NOTE — TELEPHONE ENCOUNTER
Pt woke up this morning with the right side of her face swollen & numb. She is having problems talking. yes

## 2022-09-29 NOTE — ED QUICK NOTES
RN called MRI department. Tech there states 2 patients still ahead of this one and it will be a couple of hours.

## 2022-09-30 LAB
ANION GAP SERPL CALC-SCNC: 5 MMOL/L (ref 0–18)
BASOPHILS # BLD AUTO: 0.05 X10(3) UL (ref 0–0.2)
BASOPHILS NFR BLD AUTO: 0.7 %
BUN BLD-MCNC: 15 MG/DL (ref 7–18)
CALCIUM BLD-MCNC: 9.3 MG/DL (ref 8.5–10.1)
CHLORIDE SERPL-SCNC: 111 MMOL/L (ref 98–112)
CLARITY CSF: CLEAR
CO2 SERPL-SCNC: 24 MMOL/L (ref 21–32)
COLOR CSF: COLORLESS
COUNT PERFORMED ON TUBE: 4
CREAT BLD-MCNC: 0.6 MG/DL
EOSINOPHIL # BLD AUTO: 0.29 X10(3) UL (ref 0–0.7)
EOSINOPHIL NFR BLD AUTO: 4.3 %
ERYTHROCYTE [DISTWIDTH] IN BLOOD BY AUTOMATED COUNT: 15.4 %
GFR SERPLBLD BASED ON 1.73 SQ M-ARVRAT: 109 ML/MIN/1.73M2 (ref 60–?)
GLUCOSE BLD-MCNC: 87 MG/DL (ref 70–99)
GLUCOSE CSF-MCNC: 54 MG/DL (ref 40–70)
HCT VFR BLD AUTO: 36.7 %
HGB BLD-MCNC: 11.6 G/DL
IMM GRANULOCYTES # BLD AUTO: 0.01 X10(3) UL (ref 0–1)
IMM GRANULOCYTES NFR BLD: 0.1 %
LYMPHOCYTES # BLD AUTO: 1.92 X10(3) UL (ref 1–4)
LYMPHOCYTES NFR BLD AUTO: 28.4 %
MCH RBC QN AUTO: 28.3 PG (ref 26–34)
MCHC RBC AUTO-ENTMCNC: 31.6 G/DL (ref 31–37)
MCV RBC AUTO: 89.5 FL
MONOCYTES # BLD AUTO: 0.51 X10(3) UL (ref 0.1–1)
MONOCYTES NFR BLD AUTO: 7.6 %
NEUTROPHILS # BLD AUTO: 3.97 X10 (3) UL (ref 1.5–7.7)
NEUTROPHILS # BLD AUTO: 3.97 X10(3) UL (ref 1.5–7.7)
NEUTROPHILS NFR BLD AUTO: 58.9 %
OSMOLALITY SERPL CALC.SUM OF ELEC: 290 MOSM/KG (ref 275–295)
PLATELET # BLD AUTO: 268 10(3)UL (ref 150–450)
POTASSIUM SERPL-SCNC: 4.3 MMOL/L (ref 3.5–5.1)
PROT PATTERN CSF ELPH-IMP: 43.5 MG/DL (ref 15–45)
RBC # BLD AUTO: 4.1 X10(6)UL
RBC # CSF: 1 /MM3 (ref ?–1)
SODIUM SERPL-SCNC: 140 MMOL/L (ref 136–145)
TOTAL CELLS COUNTED CSF: 2 /MM3 (ref 0–5)
TOTAL VOLUME CSF: 8 ML
WBC # BLD AUTO: 6.8 X10(3) UL (ref 4–11)

## 2022-09-30 PROCEDURE — 99232 SBSQ HOSP IP/OBS MODERATE 35: CPT | Performed by: INTERNAL MEDICINE

## 2022-09-30 PROCEDURE — 99255 IP/OBS CONSLTJ NEW/EST HI 80: CPT | Performed by: OTHER

## 2022-09-30 PROCEDURE — 009U3ZX DRAINAGE OF SPINAL CANAL, PERCUTANEOUS APPROACH, DIAGNOSTIC: ICD-10-PCS | Performed by: OTHER

## 2022-09-30 RX ORDER — PANTOPRAZOLE SODIUM 40 MG/1
40 TABLET, DELAYED RELEASE ORAL
Status: DISCONTINUED | OUTPATIENT
Start: 2022-10-01 | End: 2022-10-02

## 2022-09-30 RX ORDER — ENOXAPARIN SODIUM 100 MG/ML
40 INJECTION SUBCUTANEOUS NIGHTLY
Status: DISCONTINUED | OUTPATIENT
Start: 2022-09-30 | End: 2022-10-02

## 2022-09-30 RX ORDER — LIDOCAINE HYDROCHLORIDE 10 MG/ML
10 INJECTION, SOLUTION INFILTRATION; PERINEURAL ONCE
Status: COMPLETED | OUTPATIENT
Start: 2022-09-30 | End: 2022-09-30

## 2022-09-30 NOTE — ED QUICK NOTES
Orders for admission, patient is aware of plan and ready to go upstairs. Any questions, please call ED CHATA Huddleston  at extension [phone]. Vaccinated? Yes  Type of COVID test sent:Rapid  COVID Suspicion level: Low      Titratable drug(s) infusing:None  Rate:    LOC at time of transport:AOx4    Other pertinent information:Patient able to move all extremeties, only symptom is right sided facial droop and some numbness on right side of face.     CIWA score=N/A  NIH score=N/A

## 2022-09-30 NOTE — PLAN OF CARE
47 y/o F. A&Ox4. VSS. NSR, on tele. Patient denies pain. Right facial droop noted. Patient denies any numbness. Up with standby assist.   Safety precautions in place. All needs met at this time, will continue to monitor.       Problem: Patient/Family Goals  Goal: Patient/Family Long Term Goal  Description: Patient's Long Term Goal: Discharge    Interventions:  - MD Consults  - Neuro eval  - NPO SLP  - PT/OT  - See additional Care Plan goals for specific interventions  Outcome: Progressing  Goal: Patient/Family Short Term Goal  Description: Patient's Short Term Goal: NPO    Interventions:   - SLP eval  - See additional Care Plan goals for specific interventions  Outcome: Progressing

## 2022-09-30 NOTE — PROGRESS NOTES
NURSING ADMISSION NOTE      Patient admitted to [facility] via Cart at 2200. Admission navigator completed. Patient is A&Ox4. Right facial droop noted. Patient c/o headache and numbness/tingling sensation on right face/neck. Neurology on consult. Afebrile. VSS. Room air, denies SOB. NSR, on tele. NPO, SLP for eval. Patient denies N/V/D. Continent. Up standby. Denies weakness/numbness to BLE. PT/OT for eval.    Oriented to room. Safety precautions initiated. Bed in low position. Call light in reach. All questions and concerns addressed. Updated on plan of care. All needs met at this time.

## 2022-09-30 NOTE — ED PROVIDER NOTES
Signout to check MRI results, as below. Discussed the results of the MRI with Dr. Taylor Diana and on reexamination the patient, paralysis does not clearly include her forehead or periorbital muscles. Discussed findings with patient, she will need evaluation for possible multiple sclerosis, would like to stay in the hospital tonight and will have further evaluation tomorrow morning along with likely LP. She is hemodynamically stable and otherwise without complaint at this time Case was endorsed to [facility] hospitalist.    MRI BRAIN MRA [facility] NECK (ALL W+WO) (CPT=70553/86594/11146)    Result Date: 9/29/2022  PROCEDURE:  MRI BRAIN MRA HEAD+MRA NECK (ALL W+WO) (CPT=70553/54535/53673)  LOCATION:  [id number]   COMPARISON:  None. INDICATIONS:  eval CVA  TECHNIQUE:  MRI of the brain was performed with multi-planar T1, T2-weighted images with FLAIR sequences and diffusion weighted images without and with infusion. MR angiography of the brain without and with infusion and MR angiography of the neck without and with infusion was performed using 3D time of flight, multi-planar and 3D reconstructed images. All measurements obtained in this exam were performed using NASCET criteria. PATIENT STATED HISTORY:(As transcribed by Technologist)  Eval CVA   CONTRAST USED:  12 mL of Dotarem  FINDINGS:  MRI BRAIN FINDINGS:   The ventricles and sulci are within normal limits. There is no midline shift or mass effect. The basal cisterns are patent. The craniocervical junction is unremarkable. Minimal cerebellar tonsillar ectopia. Corpus callosum and optic chiasm are unremarkable. There is no acute intracranial hemorrhage or extra-axial fluid collection identified. Scattered FLAIR abnormalities in the subcortical white matter are noted. This is greater than expected for the patient's age. No significant abnormal parenchymal gradient susceptibility. There is no abnormal parenchymal or leptomeningeal enhancement.   There is no restricted diffusion to suggest acute ischemia/infarction. The visualized paranasal sinuses and mastoid air cells are unremarkable. The expected major intracranial flow voids are present. MRA BRAIN The upper cervical, petrous, cavernous, and supraclinoid internal carotid arteries are unremarkable. An anterior communicating artery is seen. The branches of the anterior cerebral and middle cerebral arteries are unremarkable. Fetal origin of the right posterior cerebral artery. The branches of the posterior cerebral and superior cerebellar arteries are unremarkable. The basilar artery has a normal course and caliber. The bilateral vertebral arteries are unremarkable. The origins of the bilateral PICA are seen. MRA NECK There is a 3-vessel aortic arch. The origins of the branch vessels appear widely patent. The bilateral subclavian arteries and innominate artery are unremarkable. The common carotid arteries are widely patent. The carotid bifurcations appear unremarkable. There is no evidence of hemodynamically significant stenosis in the carotid bulbs by NASCET criteria. The cervical internal carotid arteries are widely patent. The vertebral arteries originate from the subclavian arteries. The origins of the vertebral arteries are patent. The cervical vertebral arteries are widely patent. Hypoplastic right vertebral artery. CONCLUSION:   1. No evidence of an acute infarct. 2. Numerous subcortical FLAIR abnormalities throughout the white matter is noted. This is greater than expected for the patient's age. This may be due to chronic small vessel ischemic disease that is advanced. Developing demyelinating process is a consideration. Focal hyperintensity on diffusion-weighted images in the left frontal lobe subcortical white matter (image 20) is likely due to T2 shine through. An acute infarct is not identified. 3. Unremarkable MR angiogram head examination.   4. Unremarkable MR angiogram neck examination.     Dictated by (CST): Marsha Wagner MD on 9/29/2022 at 5:36 PM     Finalized by (CST): Marsha aWgner MD on 9/29/2022 at 5:45 PM

## 2022-10-01 PROCEDURE — 99232 SBSQ HOSP IP/OBS MODERATE 35: CPT | Performed by: OTHER

## 2022-10-01 PROCEDURE — 99232 SBSQ HOSP IP/OBS MODERATE 35: CPT | Performed by: HOSPITALIST

## 2022-10-01 RX ORDER — ACETAMINOPHEN 500 MG
500 TABLET ORAL EVERY 4 HOURS PRN
Status: DISCONTINUED | OUTPATIENT
Start: 2022-10-01 | End: 2022-10-02

## 2022-10-01 RX ORDER — TRAZODONE HYDROCHLORIDE 50 MG/1
50 TABLET ORAL NIGHTLY PRN
Status: DISCONTINUED | OUTPATIENT
Start: 2022-10-01 | End: 2022-10-02

## 2022-10-01 NOTE — PLAN OF CARE
Assumed patient care at [address] [place] [address]. Patient is A&Ox4. Right facial droop noted. Neurology is following. On IV Solu-MEDROL. Afebrile. VSS. Room air, denies SOB. NSR, on tele. On regular diet. Patient denies N/V/D. Patient c/o 7/10 headache, PRN Tylenol given with relief. Patient c/o of insomnia, PRN Trazodone given. Medications were administered per protocol. Right AC PIV - SL, C/D/I. Continent. Up standby assist.  All questions and concerns addressed. Updated on plan of care. All needs met at this time. Problem: Patient/Family Goals  Goal: Patient/Family Long Term Goal  Description: Patient's Long Term Goal: Discharge    Interventions:  - MD Consults  - IV Solu-MEDROL  - pain management  - See additional Care Plan goals for specific interventions  Outcome: Progressing  Goal: Patient/Family Short Term Goal  Description: Patient's Short Term Goal: Pain management.     Interventions:   - pain medication PRN  - See additional Care Plan goals for specific interventions  Outcome: Progressing

## 2022-10-01 NOTE — PLAN OF CARE
Patient alert and oriented x4. On RA. Denies pain. R facial droop. IV solumedrol. Resting comfortably in bed. WCTM. Plan for MRI. Screening form completed. 1150:  Pt c/o new onset, L leg cramping, L arm numbness and tingling. Neuro paged. no back pain

## 2022-10-02 ENCOUNTER — APPOINTMENT (OUTPATIENT)
Dept: MRI IMAGING | Facility: HOSPITAL | Age: 52
End: 2022-10-02
Attending: Other
Payer: COMMERCIAL

## 2022-10-02 VITALS
HEART RATE: 80 BPM | TEMPERATURE: 98 F | WEIGHT: 125.63 LBS | HEIGHT: 62 IN | SYSTOLIC BLOOD PRESSURE: 113 MMHG | RESPIRATION RATE: 18 BRPM | DIASTOLIC BLOOD PRESSURE: 59 MMHG | BODY MASS INDEX: 23.12 KG/M2 | OXYGEN SATURATION: 98 %

## 2022-10-02 DIAGNOSIS — A60.04 HERPES SIMPLEX VULVOVAGINITIS: ICD-10-CM

## 2022-10-02 PROCEDURE — 72157 MRI CHEST SPINE W/O & W/DYE: CPT | Performed by: OTHER

## 2022-10-02 PROCEDURE — 72156 MRI NECK SPINE W/O & W/DYE: CPT | Performed by: OTHER

## 2022-10-02 PROCEDURE — 99239 HOSP IP/OBS DSCHRG MGMT >30: CPT | Performed by: HOSPITALIST

## 2022-10-02 PROCEDURE — 99232 SBSQ HOSP IP/OBS MODERATE 35: CPT | Performed by: OTHER

## 2022-10-02 RX ORDER — PANTOPRAZOLE SODIUM 40 MG/1
40 TABLET, DELAYED RELEASE ORAL
Qty: 15 TABLET | Refills: 0 | Status: SHIPPED | OUTPATIENT
Start: 2022-10-03

## 2022-10-02 RX ORDER — PREDNISONE 20 MG/1
TABLET ORAL
Qty: 30 TABLET | Refills: 0 | Status: SHIPPED | OUTPATIENT
Start: 2022-10-02

## 2022-10-02 NOTE — PLAN OF CARE
Assumed pt care at 0730. A&Ox4. VSS. Room air. NSR on tele. R AC PIV SL, IV Solumedrol Q12H. Denies pain, denies N/V. Very mild R facial droop still present. Voiding, up ad angel to bathroom. Regular diet. Lovenox subcutaneous for VTE prevention. MRI cervical/thoracic spine to be completed this AM. Pt updated with POC. Problem: Patient/Family Goals  Goal: Patient/Family Long Term Goal  Description: Patient's Long Term Goal: Discharge  Interventions:  - MD Consults  - IV Solu-MEDROL  - pain management  - See additional Care Plan goals for specific interventions  Outcome: Progressing  Goal: Patient/Family Short Term Goal  Description: Patient's Short Term Goal: Pain management. Interventions:   - pain medication PRN  - See additional Care Plan goals for specific interventions  Outcome: Progressing     Problem: NEUROLOGICAL - ADULT  Goal: Achieves stable or improved neurological status  Description: INTERVENTIONS  - Assess for and report changes in neurological status  - Initiate measures to prevent increased intracranial pressure  - Maintain blood pressure and fluid volume within ordered parameters to optimize cerebral perfusion and minimize risk of hemorrhage  - Monitor temperature, glucose, and sodium.  Initiate appropriate interventions as ordered  Outcome: Progressing

## 2022-10-02 NOTE — PHYSICAL THERAPY NOTE
Attempted to see pt for PT treatment. Pt politely declines reports she feels she is back to baseline and with no further skilled IP PT needs. Pt politely declines trial of stairs and has no concerns re: mobility for return home. RN confirms pt has been up ad angel in room without difficulty and ambulating. Pt likely to dc later today. Will dc from IP skilled PT services. RN aware.

## 2022-10-02 NOTE — PLAN OF CARE
NURSING DISCHARGE NOTE    Discharged Home via Wheelchair. Accompanied by Family member and Support staff  Belongings Taken by patient/family. Pt discharged in calm, stable status to home. Discharge paperwork provided & discussed, pt verbalized understanding. PIV removed. No paper prescriptions to provide.

## 2022-10-02 NOTE — PLAN OF CARE
Pt is aox4. States no pain. IV saline locked. Medicated per orders. Resting in bed. Generalized weakness. Up with standby assist. MRI ordered for Sunday. Slight right sided facial droop. Problem: Patient/Family Goals  Goal: Patient/Family Long Term Goal  Description: Patient's Long Term Goal: Discharge    Interventions:  - MD Consults  - IV Solu-MEDROL  - pain management  - See additional Care Plan goals for specific interventions  Outcome: Progressing  Goal: Patient/Family Short Term Goal  Description: Patient's Short Term Goal: Pain management.     Interventions:   - pain medication PRN  - See additional Care Plan goals for specific interventions  Outcome: Progressing

## 2022-10-03 ENCOUNTER — PATIENT OUTREACH (OUTPATIENT)
Dept: CASE MANAGEMENT | Age: 52
End: 2022-10-03

## 2022-10-03 LAB
ALBUMIN INDEX: 5.7 RATIO
ALBUMIN, CSF: 21 MG/DL
ALBUMIN, SERUM/PLASMA, NEPH: 3695 MG/DL
ANGIOTENSIN CONVERTING E, CSF: 1.4 U/L
AQUAPORIN-4 RECEPTOR ANTIBODY: <1.5 U/ML
CSF IGG SYNTHESIS RATE: <0 MG/D
CSF IGG/ALBUMIN RATIO: 0.21 RATIO
CSF OLIGOCLONAL BANDS NUMBER: 0 BANDS
CSF OLIGOCLONAL BANDS: NEGATIVE
IGG INDEX: 0.51 RATIO
IMMUNOGLOBULIN G CSF: 4.4 MG/DL
IMMUNOGLOBULIN G: 1512 MG/DL

## 2022-10-03 RX ORDER — VALACYCLOVIR HYDROCHLORIDE 500 MG/1
500 TABLET, FILM COATED ORAL DAILY
Qty: 90 TABLET | Refills: 0 | Status: SHIPPED | OUTPATIENT
Start: 2022-10-03

## 2022-10-03 RX ORDER — VALACYCLOVIR HYDROCHLORIDE 500 MG/1
TABLET, FILM COATED ORAL
Qty: 14 TABLET | Refills: 0 | OUTPATIENT
Start: 2022-10-03

## 2022-10-03 NOTE — PROGRESS NOTES
Attempted to contact pt for TCM however call rang twice and then a lady answered. Requested to speak to [facility] and no response. Waited and continued to say Hello but no response. Call was ended. Tried to call back and no answer. Unable to leave a VM at this time. NCM to try again at a later time.

## 2022-10-03 NOTE — PAYOR COMM NOTE
Discharge Notification    Patient Name: Elfrieda Sandifer: BCBS PPO  Subscriber #: [id number]  Authorization Number: Everton Velasquez Date/Time: 9/29/2022 12:11 PM  Discharge Date/Time: 10/2/2022 5:10 PM

## 2022-10-03 NOTE — TELEPHONE ENCOUNTER
Last OV: 12/28/21 with Dr. Hector Fuentes for pre-op  Last refill date: 12/29/21  Follow-up: post op  Next appt.: none scheduled    Refill sent

## 2022-10-04 ENCOUNTER — TELEPHONE (OUTPATIENT)
Dept: PHYSICAL THERAPY | Facility: HOSPITAL | Age: 52
End: 2022-10-04

## 2022-10-04 NOTE — PROGRESS NOTES
Pt called back, NCM calling back. Attempted to contact pt for TCM however no answer. Call continued to ring and did not go to a VM. Will await a returned phone call.

## 2022-10-17 NOTE — PROCEDURES
Lumbar puncture procedure Note       Indication - facial weakness and MRI changes    Local anesthesia - local 1% lidocaine w/o epi     Informed consent was obtained from the patient. The area was prepped and draped in the usual sterile fashion. Using landmarks, a 22 guage spinal needle was inserted in the L4-L5 innerspace. The stylet was removed and clear CSF fluid was seen. 12cc of clear fluid was collected and sent for routine studies. CSF was sent for oligoconal bands, cell count and diff, WBC and RBC. The patient tolerated the procedure well. There was no blood loss or hematoma.

## 2022-10-26 ENCOUNTER — TELEPHONE (OUTPATIENT)
Dept: SURGERY | Facility: CLINIC | Age: 52
End: 2022-10-26

## 2022-10-26 NOTE — TELEPHONE ENCOUNTER
Pt states has tingling and numbness on head and lips still, would like earlier appt than 1/17/2022 for HFU. Pt states only availability is on Tuesdays. Please call pt to discuss.

## 2022-11-05 ENCOUNTER — TELEPHONE (OUTPATIENT)
Dept: NEUROLOGY | Facility: CLINIC | Age: 52
End: 2022-11-05

## 2022-11-05 ENCOUNTER — HOSPITAL ENCOUNTER (EMERGENCY)
Facility: HOSPITAL | Age: 52
Discharge: HOME | End: 2022-11-05
Attending: EMERGENCY MEDICINE
Payer: COMMERCIAL

## 2022-11-05 VITALS
DIASTOLIC BLOOD PRESSURE: 76 MMHG | TEMPERATURE: 99 F | SYSTOLIC BLOOD PRESSURE: 126 MMHG | WEIGHT: 115 LBS | OXYGEN SATURATION: 99 % | HEART RATE: 87 BPM | RESPIRATION RATE: 16 BRPM | BODY MASS INDEX: 21.16 KG/M2 | HEIGHT: 62 IN

## 2022-11-05 DIAGNOSIS — H18.821 CORNEAL ABRASION OF RIGHT EYE DUE TO CONTACT LENS: Primary | ICD-10-CM

## 2022-11-05 DIAGNOSIS — A60.04 HERPES SIMPLEX VULVOVAGINITIS: ICD-10-CM

## 2022-11-05 PROCEDURE — 99283 EMERGENCY DEPT VISIT LOW MDM: CPT

## 2022-11-05 RX ORDER — TETRACAINE HYDROCHLORIDE 5 MG/ML
1 SOLUTION OPHTHALMIC ONCE
Status: COMPLETED | OUTPATIENT
Start: 2022-11-05 | End: 2022-11-05

## 2022-11-05 RX ORDER — CIPROFLOXACIN HYDROCHLORIDE 3.5 MG/ML
2 SOLUTION/ DROPS TOPICAL
Qty: 5 ML | Refills: 0 | Status: SHIPPED | OUTPATIENT
Start: 2022-11-05 | End: 2022-11-12

## 2022-11-05 RX ORDER — CIPROFLOXACIN HYDROCHLORIDE 3.5 MG/ML
2 SOLUTION/ DROPS TOPICAL
Qty: 5 ML | Refills: 0 | Status: SHIPPED | OUTPATIENT
Start: 2022-11-05 | End: 2022-11-05

## 2022-11-05 NOTE — TELEPHONE ENCOUNTER
Recently diagnosed probable MS. Admitted at [facility] early Oct    Finish steroids and right face lower facial weakness improved    On Wednesday had right eye redness, was able to open but now not able to open it,  droopy eyelid and not able to open it    Face looks swollen and it hurts. Has facial droop as well    No vision loss. No eye pain. No headache    Advise to come to the ED. Need to r/o orbital cellulitis or any eye infection  Possible CN palsies due to MS  Need repeat MRI brain/orbit     She indicates understanding.

## 2022-11-05 NOTE — DISCHARGE INSTRUCTIONS
Do not wear contact lenses until cleared by ophthalmology or an optometrist.  Apply eyedrops every 6 hours as prescribed. Do not try to rub your eye or irritate your eye any further. If you start noticing redness around the eyelid going down to your cheek or you develop fever or blurred vision or pain with movements of your eye return to the ER immediately for further evaluation. Alternate Tylenol and Motrin as needed for pain control. Please follow-up with your primary care physician 1-2 days return to the ER if your symptoms worsen progress or if you have any further concerns.     Please see an optometrist on Monday or follow-up with your ophthalmologist on Monday at the latest.

## 2022-11-05 NOTE — ED INITIAL ASSESSMENT (HPI)
Patient presents for evaluation of right eye pain and redness since Thursday. She states this morning she woke up and she is unable to open her eye and is having persistent twitching. Was seen here in September for similar symptoms.

## 2022-11-07 NOTE — TELEPHONE ENCOUNTER
Thank you Ritu. Her LP actually came back as normal. She has a a few T2/FLAIR changes in the brain and a small one int he cervical spine. Im not certain if this is MS or not as there were no bands and I couldn't see if there was really any significant improvement with her last dose of steroids.

## 2022-12-05 NOTE — TELEPHONE ENCOUNTER
Called patient and LM that there is an open appt tomorrow in [place]. Informed in message that she needs to call back ASAP as appt is not being held and additional patients have been called.

## 2022-12-05 NOTE — TELEPHONE ENCOUNTER
No call back as of time of this writing. RN will continue to scan for open appts on a Tuesday. Per Dr. Noreen jara for patient to keep current follow up scheduled in January 2023.

## 2023-01-03 ENCOUNTER — TELEPHONE (OUTPATIENT)
Dept: FAMILY MEDICINE CLINIC | Facility: CLINIC | Age: 53
End: 2023-01-03

## 2023-01-03 DIAGNOSIS — G51.0 FACIAL PARALYSIS: Primary | ICD-10-CM

## 2023-01-03 NOTE — TELEPHONE ENCOUNTER
Spoke with pt. States she now has HMO insurance. She requests a neurology referral---she has an appt with Dr. Rolando Samuel on 1/17/23 for ER f/u/ facial paralysis. 1. Advised that we need her new insurance info before we can place a referral.  Pt will c/b and give her new insurance info to a PSR. Then referral can be placed. 2. Pt advised that she needs to schedule an OV with Dr. Trevin Elliott soon. Last appt with his was telemed on 9/29/20. She then saw Dr. Marilynn Blackman on 7/23/21 and Yahaira Morgan on 11/6/21. Pt states she thinks Dr. Marilynn Blackman is her PCP. Advised either way, she should be seen in our office at least once a year.

## 2023-01-05 NOTE — TELEPHONE ENCOUNTER
Pt calls back and told  that the insurance we have in the computer is correct, so she needs the referral placed. We have [employer] [place] PPO. Emilia SHEN Checked this for me.  To confirm she does not have an HMO as pt first told me.  ~~~~~~~~~~~~~~~~~~~~~~~~~~~    Referral placed for Dr. Jonathan Segovia

## 2023-01-17 ENCOUNTER — OFFICE VISIT (OUTPATIENT)
Dept: NEUROLOGY | Facility: CLINIC | Age: 53
End: 2023-01-17
Payer: COMMERCIAL

## 2023-01-17 VITALS
SYSTOLIC BLOOD PRESSURE: 116 MMHG | DIASTOLIC BLOOD PRESSURE: 64 MMHG | BODY MASS INDEX: 23 KG/M2 | RESPIRATION RATE: 16 BRPM | HEART RATE: 76 BPM | WEIGHT: 126 LBS

## 2023-01-17 DIAGNOSIS — R93.0 ABNORMAL MRI OF HEAD: Primary | ICD-10-CM

## 2023-01-17 DIAGNOSIS — G51.0 FACIAL PARALYSIS: ICD-10-CM

## 2023-01-17 PROCEDURE — 99213 OFFICE O/P EST LOW 20 MIN: CPT | Performed by: OTHER

## 2023-01-17 PROCEDURE — 3078F DIAST BP <80 MM HG: CPT | Performed by: OTHER

## 2023-01-17 PROCEDURE — 3074F SYST BP LT 130 MM HG: CPT | Performed by: OTHER

## 2023-01-17 NOTE — PROGRESS NOTES
Patient was seen in the ED on 09/29/2022 for right sided facial weakness, numbness and tingling. Patient states on and off dizziness. Denies drooping in the facial region. Patient states some stuttering on and off. Patient states a few headaches.

## 2023-01-27 ENCOUNTER — TELEPHONE (OUTPATIENT)
Dept: NEUROLOGY | Facility: CLINIC | Age: 53
End: 2023-01-27

## 2023-01-27 NOTE — TELEPHONE ENCOUNTER
Voice mail left advising pt can be seen earlier with ARCHIE Blackburn. Pt to call REAL if wishes to schedule.

## 2023-01-27 NOTE — TELEPHONE ENCOUNTER
Per provider, if pt would like earlier appt, pt can follow up with ARCHIE Rivera. Routed to  to call pt to discuss above.

## 2023-01-30 ENCOUNTER — TELEPHONE (OUTPATIENT)
Dept: NEUROLOGY | Facility: CLINIC | Age: 53
End: 2023-01-30

## 2023-01-30 NOTE — TELEPHONE ENCOUNTER
Called patient back at [phone], went straight to voicemail. Left message with return phone; I can be reached at [phone] until 3 pm today.

## 2023-01-30 NOTE — TELEPHONE ENCOUNTER
Pt is calling to state since last week she has been experiencing nausea and headaches along with the right side of her face, fingertips and toes being numb. Pt is requesting a call back to discuss symptoms.  Pt can be reached at [phone]

## 2023-02-07 ENCOUNTER — HOSPITAL ENCOUNTER (OUTPATIENT)
Dept: MRI IMAGING | Facility: HOSPITAL | Age: 53
Discharge: HOME | End: 2023-02-07
Attending: Other
Payer: COMMERCIAL

## 2023-02-07 DIAGNOSIS — R93.0 ABNORMAL MRI OF HEAD: ICD-10-CM

## 2023-02-07 PROCEDURE — A9575 INJ GADOTERATE MEGLUMI 0.1ML: HCPCS | Performed by: OTHER

## 2023-02-07 PROCEDURE — 70553 MRI BRAIN STEM W/O & W/DYE: CPT | Performed by: OTHER

## 2023-02-07 RX ORDER — GADOTERATE MEGLUMINE 376.9 MG/ML
11 INJECTION INTRAVENOUS
Status: COMPLETED | OUTPATIENT
Start: 2023-02-07 | End: 2023-02-07

## 2023-02-07 RX ADMIN — GADOTERATE MEGLUMINE 11 ML: 376.9 INJECTION INTRAVENOUS at 12:55:00

## 2023-02-22 ENCOUNTER — PATIENT OUTREACH (OUTPATIENT)
Dept: FAMILY MEDICINE CLINIC | Facility: CLINIC | Age: 53
End: 2023-02-22

## 2023-04-10 DIAGNOSIS — A60.04 HERPES SIMPLEX VULVOVAGINITIS: ICD-10-CM

## 2023-04-10 NOTE — TELEPHONE ENCOUNTER
Last OV: 12/28/21 with Dr. Jose Villareal for pre-op  Last refill date: 10/3/22  Follow-up: annual  Next appt.: none scheduled    Patient due for annual. Please contact her to schedule appt and then return to RN pool for refill.  Thank you

## 2023-04-12 ENCOUNTER — TELEPHONE (OUTPATIENT)
Dept: FAMILY MEDICINE CLINIC | Facility: CLINIC | Age: 53
End: 2023-04-12

## 2023-04-12 DIAGNOSIS — A60.04 HERPES SIMPLEX VULVOVAGINITIS: ICD-10-CM

## 2023-04-12 RX ORDER — VALACYCLOVIR HYDROCHLORIDE 500 MG/1
TABLET, FILM COATED ORAL
Qty: 90 TABLET | Refills: 0 | OUTPATIENT
Start: 2023-04-12

## 2023-04-12 NOTE — TELEPHONE ENCOUNTER
Spoke with pt and informed her that she is over due or an annual. Pt states she only has next Thursday off from work otherwise she works 6 days a week. I informed pt we could schedule for next available but also informed that her insurance is assigned to a different medical group. She would need to change it before we could see her.  Pt states that doesn't help her and hung up the phone

## 2023-04-12 NOTE — TELEPHONE ENCOUNTER
PT. CALLED AND WANTED TO SCHEDULE APPT. WITH A GYNE BECAUSE HER GYNE NO LONGER ACCEPTS HER INS. I TOLD HER WE DO NOT HAVE A GYNE HERE BUT OUR DOCTORS WILL DO A PAP IF NEEDED. SHE SAID SHE JUST NEEDS HER MEDICATION REFILLED UNTIL SHE CAN FIND ANOTHER GYNE. SHE DIDN'T WANT TO GIVE ME ANY INFO SO IM NOT SURE IF ITS BIRTH CONTROL OR NOT. I DID NOT SCHEDULE ANY APPTS. YET. PLEASE CALL PT. AND DETERMINE WHAT SHE IS NEEDING.

## 2023-04-12 NOTE — TELEPHONE ENCOUNTER
L/m to c/b    (pt has not been seen here since 7/23/21---by Dr. Cecelia Goldberg.   Last appt with Dr. Uvaldo Moya was on 9/29/20)

## 2023-04-12 NOTE — TELEPHONE ENCOUNTER
Spoke with pt and informed her that she is over due or an annual. Pt states she only has next Thursday off from work otherwise she works 6 days a week. I informed pt we could schedule for next available but also informed that her insurance is assigned to a different medical group. She would need to change it before we could see her. Pt states that doesn't help her and hung up the phone.

## 2023-04-18 ENCOUNTER — OFFICE VISIT (OUTPATIENT)
Dept: FAMILY MEDICINE CLINIC | Facility: CLINIC | Age: 53
End: 2023-04-18
Payer: COMMERCIAL

## 2023-04-18 VITALS
BODY MASS INDEX: 23 KG/M2 | HEART RATE: 76 BPM | WEIGHT: 125 LBS | HEIGHT: 62 IN | RESPIRATION RATE: 16 BRPM | DIASTOLIC BLOOD PRESSURE: 60 MMHG | SYSTOLIC BLOOD PRESSURE: 100 MMHG | OXYGEN SATURATION: 100 % | TEMPERATURE: 98 F

## 2023-04-18 DIAGNOSIS — Z86.19 HISTORY OF HERPES GENITALIS: ICD-10-CM

## 2023-04-18 DIAGNOSIS — A63.0 CONDYLOMA ACUMINATA: Primary | ICD-10-CM

## 2023-04-18 PROBLEM — R79.89 AZOTEMIA: Status: RESOLVED | Noted: 2022-09-29 | Resolved: 2023-04-18

## 2023-04-18 PROBLEM — D64.9 ANEMIA: Status: RESOLVED | Noted: 2022-09-29 | Resolved: 2023-04-18

## 2023-04-18 PROBLEM — A60.04 HERPES SIMPLEX VULVOVAGINITIS: Status: RESOLVED | Noted: 2021-12-29 | Resolved: 2023-04-18

## 2023-04-18 PROBLEM — G51.0 FACIAL PARALYSIS: Status: RESOLVED | Noted: 2022-09-29 | Resolved: 2023-04-18

## 2023-04-18 PROBLEM — G51.0 BELL'S PALSY: Status: RESOLVED | Noted: 2022-09-29 | Resolved: 2023-04-18

## 2023-04-18 PROCEDURE — 3008F BODY MASS INDEX DOCD: CPT | Performed by: FAMILY MEDICINE

## 2023-04-18 PROCEDURE — 3074F SYST BP LT 130 MM HG: CPT | Performed by: FAMILY MEDICINE

## 2023-04-18 PROCEDURE — 3078F DIAST BP <80 MM HG: CPT | Performed by: FAMILY MEDICINE

## 2023-04-18 PROCEDURE — 99214 OFFICE O/P EST MOD 30 MIN: CPT | Performed by: FAMILY MEDICINE

## 2023-04-18 RX ORDER — PODOFILOX 5 MG/ML
1 SOLUTION TOPICAL 2 TIMES DAILY
Qty: 3.5 ML | Refills: 2 | Status: SHIPPED | OUTPATIENT
Start: 2023-04-18 | End: 2023-04-21

## 2023-04-18 RX ORDER — VALACYCLOVIR HYDROCHLORIDE 500 MG/1
500 TABLET, FILM COATED ORAL DAILY
Qty: 90 TABLET | Refills: 0 | Status: CANCELLED
Start: 2023-04-18

## 2023-04-18 RX ORDER — VALACYCLOVIR HYDROCHLORIDE 500 MG/1
500 TABLET, FILM COATED ORAL 2 TIMES DAILY
Qty: 30 TABLET | Refills: 0 | Status: SHIPPED | OUTPATIENT
Start: 2023-04-18 | End: 2023-04-21

## 2023-04-18 NOTE — PATIENT INSTRUCTIONS
I discussed the difference between herpes simplex infections and HPV condyloma infections. Since the patient has only had 2 outbreaks of genital herpes, would recommend episodic treatment with valacyclovir 500 mg twice daily x3 days per outbreak. Refill of valacyclovir given  Patient was given podofilox 0.5% external solution to be used to the condyloma twice daily x3 days/week, repeat weekly as needed  [facility]. Suturegard Intro: Intraoperative tissue expansion was performed, utilizing the SUTUREGARD device, in order to reduce wound tension.

## 2023-04-19 RX ORDER — VALACYCLOVIR HYDROCHLORIDE 500 MG/1
TABLET, FILM COATED ORAL
Qty: 90 TABLET | Refills: 0 | OUTPATIENT
Start: 2023-04-19

## 2023-04-24 ENCOUNTER — TELEPHONE (OUTPATIENT)
Dept: FAMILY MEDICINE CLINIC | Facility: CLINIC | Age: 53
End: 2023-04-24

## 2023-04-24 NOTE — TELEPHONE ENCOUNTER
There is no oral medication that is going to take care of this wart. If she would like I can try to freeze it but it may take more than 1 application. Please advise patient that I did not expect it to go away with 1 cycle of medication, these often take multiple weeks to resolve.

## 2023-04-24 NOTE — TELEPHONE ENCOUNTER
Pt was seen on 4/18/23 for condyloma acuminata and prescribed podofilox 0.5% ext soln to use BID x3days/ week. Spoke with pt. States medication is not helping---no improvement yest.  ALSO, states it \"burns like hell\" for a few hours after using it. Asks if she can be prescribed an oral medication for this instead? States the solution burns and she doesn't want a cream because she thinks it will rub off from her underwear.

## 2023-04-24 NOTE — TELEPHONE ENCOUNTER
Pt advised of below. States she's not happy about doing either, but will schedule an OV for tx.     OV scheduled on 4/27

## 2023-04-27 ENCOUNTER — OFFICE VISIT (OUTPATIENT)
Dept: FAMILY MEDICINE CLINIC | Facility: CLINIC | Age: 53
End: 2023-04-27
Payer: COMMERCIAL

## 2023-04-27 VITALS
SYSTOLIC BLOOD PRESSURE: 108 MMHG | OXYGEN SATURATION: 98 % | HEART RATE: 68 BPM | RESPIRATION RATE: 18 BRPM | BODY MASS INDEX: 23 KG/M2 | TEMPERATURE: 98 F | DIASTOLIC BLOOD PRESSURE: 74 MMHG | WEIGHT: 123 LBS

## 2023-04-27 DIAGNOSIS — A63.0 CONDYLOMA ACUMINATA: Primary | ICD-10-CM

## 2023-04-27 DIAGNOSIS — L24.5 IRRITANT CONTACT DERMATITIS DUE TO CHEMICAL: ICD-10-CM

## 2023-04-27 PROCEDURE — 99213 OFFICE O/P EST LOW 20 MIN: CPT | Performed by: FAMILY MEDICINE

## 2023-04-27 PROCEDURE — 3074F SYST BP LT 130 MM HG: CPT | Performed by: FAMILY MEDICINE

## 2023-04-27 PROCEDURE — 3078F DIAST BP <80 MM HG: CPT | Performed by: FAMILY MEDICINE

## 2023-04-27 NOTE — PATIENT INSTRUCTIONS
Liquid nitrogen applied to the condyloma, patient tolerated well  Bacitracin antibiotic ointment applied to the raw areas on the vulva  Advised patient to follow-up in 3 to 4 weeks to determine resolution

## 2023-04-28 ENCOUNTER — TELEPHONE (OUTPATIENT)
Dept: SURGERY | Facility: CLINIC | Age: 53
End: 2023-04-28

## 2023-04-28 RX ORDER — METHYLPREDNISOLONE 4 MG/1
TABLET ORAL
Qty: 21 EACH | Refills: 0 | Status: SHIPPED | OUTPATIENT
Start: 2023-04-28

## 2023-04-28 NOTE — TELEPHONE ENCOUNTER
Pt's head and neck have been hurting for 5 days. It is causing her problems sleeping. She has felt nauseous and dizzy for 3 days. She avoids eating because the nausea is so bad. The dizziness comes and goes; today she felt fine, but was dizzy after driving to work. She doesn't not know what to do, please advise. Pt's best call back number is [phone].  Endorsed to [place]

## 2023-04-28 NOTE — TELEPHONE ENCOUNTER
Patient had a primary care provider visit yesterday but did not mention she was having nausea and unable to eat for 3 days. She said her neck is very painful and is causing her head to hurt.

## 2023-04-28 NOTE — TELEPHONE ENCOUNTER
We can try a medrol dose pack if she would like.  Otherwise if no relief she may need to go to urgent care

## 2023-05-01 RX ORDER — METHYLPREDNISOLONE 4 MG/1
TABLET ORAL
Qty: 21 TABLET | Refills: 0 | OUTPATIENT
Start: 2023-05-01

## 2023-05-30 ENCOUNTER — HOSPITAL ENCOUNTER (INPATIENT)
Facility: HOSPITAL | Age: 53
LOS: 2 days | Discharge: HOME | End: 2023-06-01
Attending: EMERGENCY MEDICINE | Admitting: STUDENT IN AN ORGANIZED HEALTH CARE EDUCATION/TRAINING PROGRAM
Payer: COMMERCIAL

## 2023-05-30 ENCOUNTER — APPOINTMENT (OUTPATIENT)
Dept: CT IMAGING | Facility: HOSPITAL | Age: 53
End: 2023-05-30
Attending: EMERGENCY MEDICINE
Payer: COMMERCIAL

## 2023-05-30 ENCOUNTER — APPOINTMENT (OUTPATIENT)
Dept: GENERAL RADIOLOGY | Facility: HOSPITAL | Age: 53
End: 2023-05-30
Attending: EMERGENCY MEDICINE
Payer: COMMERCIAL

## 2023-05-30 ENCOUNTER — TELEPHONE (OUTPATIENT)
Dept: SURGERY | Facility: CLINIC | Age: 53
End: 2023-05-30

## 2023-05-30 DIAGNOSIS — R29.810 FACIAL DROOP: Primary | ICD-10-CM

## 2023-05-30 LAB
ALBUMIN SERPL-MCNC: 3.9 G/DL (ref 3.4–5)
ALBUMIN/GLOB SERPL: 0.9 {RATIO} (ref 1–2)
ALP LIVER SERPL-CCNC: 60 U/L
ALT SERPL-CCNC: 26 U/L
ANION GAP SERPL CALC-SCNC: 6 MMOL/L (ref 0–18)
APTT PPP: 40.2 SECONDS (ref 23.3–35.6)
AST SERPL-CCNC: 26 U/L (ref 15–37)
ATRIAL RATE: 84 BPM
BASOPHILS # BLD AUTO: 0.03 X10(3) UL (ref 0–0.2)
BASOPHILS NFR BLD AUTO: 0.3 %
BILIRUB SERPL-MCNC: 0.4 MG/DL (ref 0.1–2)
BILIRUB UR QL STRIP.AUTO: NEGATIVE
BUN BLD-MCNC: 12 MG/DL (ref 7–18)
CALCIUM BLD-MCNC: 9.3 MG/DL (ref 8.5–10.1)
CHLORIDE SERPL-SCNC: 107 MMOL/L (ref 98–112)
CLARITY UR REFRACT.AUTO: CLEAR
CO2 SERPL-SCNC: 25 MMOL/L (ref 21–32)
COLOR UR AUTO: YELLOW
CREAT BLD-MCNC: 0.63 MG/DL
EOSINOPHIL # BLD AUTO: 0.16 X10(3) UL (ref 0–0.7)
EOSINOPHIL NFR BLD AUTO: 1.6 %
ERYTHROCYTE [DISTWIDTH] IN BLOOD BY AUTOMATED COUNT: 13.9 %
GFR SERPLBLD BASED ON 1.73 SQ M-ARVRAT: 107 ML/MIN/1.73M2 (ref 60–?)
GLOBULIN PLAS-MCNC: 4.4 G/DL (ref 2.8–4.4)
GLUCOSE BLD-MCNC: 87 MG/DL (ref 70–99)
GLUCOSE BLD-MCNC: 90 MG/DL (ref 70–99)
GLUCOSE BLD-MCNC: 99 MG/DL (ref 70–99)
GLUCOSE UR STRIP.AUTO-MCNC: NEGATIVE MG/DL
HCT VFR BLD AUTO: 39.7 %
HGB BLD-MCNC: 13 G/DL
IMM GRANULOCYTES # BLD AUTO: 0.02 X10(3) UL (ref 0–1)
IMM GRANULOCYTES NFR BLD: 0.2 %
INR BLD: 1.1 (ref 0.85–1.16)
LEUKOCYTE ESTERASE UR QL STRIP.AUTO: NEGATIVE
LYMPHOCYTES # BLD AUTO: 2.53 X10(3) UL (ref 1–4)
LYMPHOCYTES NFR BLD AUTO: 25.3 %
MCH RBC QN AUTO: 29.1 PG (ref 26–34)
MCHC RBC AUTO-ENTMCNC: 32.7 G/DL (ref 31–37)
MCV RBC AUTO: 88.8 FL
MONOCYTES # BLD AUTO: 0.63 X10(3) UL (ref 0.1–1)
MONOCYTES NFR BLD AUTO: 6.3 %
NEUTROPHILS # BLD AUTO: 6.62 X10 (3) UL (ref 1.5–7.7)
NEUTROPHILS # BLD AUTO: 6.62 X10(3) UL (ref 1.5–7.7)
NEUTROPHILS NFR BLD AUTO: 66.3 %
NITRITE UR QL STRIP.AUTO: NEGATIVE
OSMOLALITY SERPL CALC.SUM OF ELEC: 286 MOSM/KG (ref 275–295)
P AXIS: 57 DEGREES
P-R INTERVAL: 122 MS
PH UR STRIP.AUTO: 5 [PH] (ref 5–8)
PLATELET # BLD AUTO: 242 10(3)UL (ref 150–450)
POTASSIUM SERPL-SCNC: 3.3 MMOL/L (ref 3.5–5.1)
PROT SERPL-MCNC: 8.3 G/DL (ref 6.4–8.2)
PROT UR STRIP.AUTO-MCNC: NEGATIVE MG/DL
PROTHROMBIN TIME: 14.2 SECONDS (ref 11.6–14.8)
Q-T INTERVAL: 378 MS
QRS DURATION: 76 MS
QTC CALCULATION (BEZET): 446 MS
R AXIS: 33 DEGREES
RBC # BLD AUTO: 4.47 X10(6)UL
SODIUM SERPL-SCNC: 138 MMOL/L (ref 136–145)
SP GR UR STRIP.AUTO: >1.03 (ref 1–1.03)
T AXIS: 42 DEGREES
TROPONIN I HIGH SENSITIVITY: <3 NG/L
UROBILINOGEN UR STRIP.AUTO-MCNC: <2 MG/DL
VENTRICULAR RATE: 84 BPM
WBC # BLD AUTO: 10 X10(3) UL (ref 4–11)

## 2023-05-30 PROCEDURE — 99223 1ST HOSP IP/OBS HIGH 75: CPT | Performed by: OTHER

## 2023-05-30 PROCEDURE — 99222 1ST HOSP IP/OBS MODERATE 55: CPT | Performed by: HOSPITALIST

## 2023-05-30 PROCEDURE — 70450 CT HEAD/BRAIN W/O DYE: CPT | Performed by: EMERGENCY MEDICINE

## 2023-05-30 PROCEDURE — 70496 CT ANGIOGRAPHY HEAD: CPT | Performed by: EMERGENCY MEDICINE

## 2023-05-30 PROCEDURE — 71045 X-RAY EXAM CHEST 1 VIEW: CPT | Performed by: EMERGENCY MEDICINE

## 2023-05-30 PROCEDURE — 70498 CT ANGIOGRAPHY NECK: CPT | Performed by: EMERGENCY MEDICINE

## 2023-05-30 RX ORDER — ENOXAPARIN SODIUM 100 MG/ML
40 INJECTION SUBCUTANEOUS NIGHTLY
Status: DISCONTINUED | OUTPATIENT
Start: 2023-05-30 | End: 2023-06-01

## 2023-05-30 RX ORDER — ASPIRIN 300 MG/1
300 SUPPOSITORY RECTAL ONCE
Status: DISCONTINUED | OUTPATIENT
Start: 2023-05-30 | End: 2023-05-30

## 2023-05-30 RX ORDER — ASPIRIN 325 MG
325 TABLET, DELAYED RELEASE (ENTERIC COATED) ORAL DAILY
Status: DISCONTINUED | OUTPATIENT
Start: 2023-05-31 | End: 2023-05-30

## 2023-05-30 RX ORDER — ACETAMINOPHEN 500 MG
500 TABLET ORAL EVERY 4 HOURS PRN
Status: DISCONTINUED | OUTPATIENT
Start: 2023-05-30 | End: 2023-06-01

## 2023-05-30 RX ORDER — POTASSIUM CHLORIDE 20 MEQ/1
40 TABLET, EXTENDED RELEASE ORAL ONCE
Status: COMPLETED | OUTPATIENT
Start: 2023-05-30 | End: 2023-05-30

## 2023-05-30 RX ORDER — ACETAMINOPHEN 500 MG
500 TABLET ORAL EVERY 4 HOURS PRN
Status: DISCONTINUED | OUTPATIENT
Start: 2023-05-30 | End: 2023-05-30

## 2023-05-30 RX ORDER — IBUPROFEN 400 MG/1
400 TABLET ORAL EVERY 6 HOURS PRN
Status: DISCONTINUED | OUTPATIENT
Start: 2023-05-30 | End: 2023-05-31

## 2023-05-30 RX ORDER — ONDANSETRON 2 MG/ML
4 INJECTION INTRAMUSCULAR; INTRAVENOUS EVERY 6 HOURS PRN
Status: DISCONTINUED | OUTPATIENT
Start: 2023-05-30 | End: 2023-06-01

## 2023-05-30 RX ORDER — SENNOSIDES 8.6 MG
17.2 TABLET ORAL NIGHTLY PRN
Status: DISCONTINUED | OUTPATIENT
Start: 2023-05-30 | End: 2023-06-01

## 2023-05-30 RX ORDER — PROCHLORPERAZINE EDISYLATE 5 MG/ML
5 INJECTION INTRAMUSCULAR; INTRAVENOUS EVERY 8 HOURS PRN
Status: DISCONTINUED | OUTPATIENT
Start: 2023-05-30 | End: 2023-06-01

## 2023-05-30 RX ORDER — ASPIRIN 325 MG
325 TABLET, DELAYED RELEASE (ENTERIC COATED) ORAL DAILY
Status: DISCONTINUED | OUTPATIENT
Start: 2023-05-30 | End: 2023-05-30

## 2023-05-30 RX ORDER — ENEMA 19; 7 G/133ML; G/133ML
1 ENEMA RECTAL ONCE AS NEEDED
Status: DISCONTINUED | OUTPATIENT
Start: 2023-05-30 | End: 2023-06-01

## 2023-05-30 RX ORDER — DEXTROSE AND SODIUM CHLORIDE 5; .45 G/100ML; G/100ML
INJECTION, SOLUTION INTRAVENOUS CONTINUOUS
Status: ACTIVE | OUTPATIENT
Start: 2023-05-30 | End: 2023-05-30

## 2023-05-30 RX ORDER — ACETAMINOPHEN 650 MG/1
650 SUPPOSITORY RECTAL ONCE
Status: COMPLETED | OUTPATIENT
Start: 2023-05-30 | End: 2023-05-30

## 2023-05-30 RX ORDER — ATORVASTATIN CALCIUM 40 MG/1
40 TABLET, FILM COATED ORAL NIGHTLY
Status: DISCONTINUED | OUTPATIENT
Start: 2023-05-30 | End: 2023-05-30

## 2023-05-30 RX ORDER — BISACODYL 10 MG
10 SUPPOSITORY, RECTAL RECTAL
Status: DISCONTINUED | OUTPATIENT
Start: 2023-05-30 | End: 2023-06-01

## 2023-05-30 RX ORDER — ASPIRIN 325 MG
325 TABLET, DELAYED RELEASE (ENTERIC COATED) ORAL ONCE
Status: DISCONTINUED | OUTPATIENT
Start: 2023-05-30 | End: 2023-05-30

## 2023-05-30 RX ORDER — ONDANSETRON 2 MG/ML
4 INJECTION INTRAMUSCULAR; INTRAVENOUS ONCE
Status: COMPLETED | OUTPATIENT
Start: 2023-05-30 | End: 2023-05-30

## 2023-05-30 RX ORDER — POLYETHYLENE GLYCOL 3350 17 G/17G
17 POWDER, FOR SOLUTION ORAL DAILY PRN
Status: DISCONTINUED | OUTPATIENT
Start: 2023-05-30 | End: 2023-06-01

## 2023-05-30 RX ORDER — MELATONIN
3 NIGHTLY PRN
Status: DISCONTINUED | OUTPATIENT
Start: 2023-05-30 | End: 2023-06-01

## 2023-05-30 RX ORDER — ONDANSETRON 2 MG/ML
INJECTION INTRAMUSCULAR; INTRAVENOUS
Status: COMPLETED
Start: 2023-05-30 | End: 2023-05-30

## 2023-05-30 RX ORDER — ASPIRIN 300 MG/1
300 SUPPOSITORY RECTAL ONCE
Status: COMPLETED | OUTPATIENT
Start: 2023-05-30 | End: 2023-05-30

## 2023-05-30 NOTE — PROGRESS NOTES
Called to ED for Stroke Alert at 1047  Arrived to department at 1051  Located patient at [facility], ED MD present  Last Known Normal at 1900 last night  Pre-morbid mRS 0    Initial NIHSS 2 including L sided facial droop and decreased sensation  Pt accompanied to CT dept  Dr Karin Samayoa (Neuro Critical Care) notified at 1059  Repeat NIHSS completed back in ED room    NIH Stroke Scale  1a. Level of consciousness: 0   1b. LOC questions:  0   1c. LOC commands: 0   2. Best Gaze: 0   3. Visual: 0   4. Facial Palsy: 1 (L sided facial droop)   5a. Motor left arm: 0   5b. Motor right arm: 0   6a. Motor left le   6b. Motor right le   7. Limb Ataxia: 0   8. Sensory: 1 (Decreased sensation to L side)   9. Best Language:  0   10. Dysarthria: 1 (Mild slurring of words)   11. Extinction and Inattention: 0     Total:   3      Other symptoms include headache and dizziness    Dr Karin Samayoa updated on patient's status, CT/CTA results and repeat NIHSS   Per Dr Karin Samayoa, patient is not a candidate for TNK, exclusions include out of window, recommends ASA and admission to CTU with general neurology consult  Case discussed with Dr Joe Pinedo, ED    Of note: Patient drove herself to ED for evaluation of her facial droop she discovered yesterday at 1900. Assessment is negative for focal weakness, ataxia and visual deficit. C/o headache and dizziness noted. Please refer to detailed breakdown of NIHSS above. Patient states she follows up with Dr Melvi Higginbotham as she had an abnormal MRI in 2023 showing lesions. Subsequently, patient had a lumbar puncture done to rule out MS, results were negative.

## 2023-05-30 NOTE — ED INITIAL ASSESSMENT (HPI)
Left facial droop starting last night at 1830 with headache. Cotinued today. Decreased left facial sensation.

## 2023-05-30 NOTE — ED QUICK NOTES
Orders for admission, patient is aware of plan and ready to go upstairs. Any questions, please call ED RN Candy Calhoun at extension [phone].      Patient Covid vaccination status: Fully vaccinated     COVID Test Ordered in ED: None    COVID Suspicion at Admission: N/A    Running Infusions:  None    Mental Status/LOC at time of transport: ACx4    Other pertinent information: L side facial droop, tingling L side, slurred speech; pt hx of brain masses; last MRI abnormal in February; pt given ASA and Tylenol    CIWA score: N/A   NIH score:  3       NIH score:  3

## 2023-05-30 NOTE — TELEPHONE ENCOUNTER
Pt has an appt today @ 10:20. Last night around 5:00, her right arm began tingling. Rt side of face drooping, drooling, can't smile straight. Should she come to appt or go to [facility]. Endorsed to [employer].

## 2023-05-30 NOTE — TELEPHONE ENCOUNTER
Spoke to patient she states she has trouble speaking (slurring noted), left arm is tingling, right arm is weak. Right side of face drooping. Patient advised to seek treatment in ER. Advised this is important for her to seek treatment. Patient verbalized understanding.

## 2023-05-31 ENCOUNTER — APPOINTMENT (OUTPATIENT)
Dept: MRI IMAGING | Facility: HOSPITAL | Age: 53
End: 2023-05-31
Attending: NURSE PRACTITIONER
Payer: COMMERCIAL

## 2023-05-31 ENCOUNTER — APPOINTMENT (OUTPATIENT)
Dept: CV DIAGNOSTICS | Facility: HOSPITAL | Age: 53
End: 2023-05-31
Attending: HOSPITALIST
Payer: COMMERCIAL

## 2023-05-31 LAB
ANION GAP SERPL CALC-SCNC: 1 MMOL/L (ref 0–18)
BASOPHILS # BLD AUTO: 0.02 X10(3) UL (ref 0–0.2)
BASOPHILS NFR BLD AUTO: 0.4 %
BUN BLD-MCNC: 13 MG/DL (ref 7–18)
CALCIUM BLD-MCNC: 8.5 MG/DL (ref 8.5–10.1)
CHLORIDE SERPL-SCNC: 112 MMOL/L (ref 98–112)
CO2 SERPL-SCNC: 26 MMOL/L (ref 21–32)
CREAT BLD-MCNC: 0.52 MG/DL
EOSINOPHIL # BLD AUTO: 0.18 X10(3) UL (ref 0–0.7)
EOSINOPHIL NFR BLD AUTO: 3.5 %
ERYTHROCYTE [DISTWIDTH] IN BLOOD BY AUTOMATED COUNT: 14.5 %
GFR SERPLBLD BASED ON 1.73 SQ M-ARVRAT: 112 ML/MIN/1.73M2 (ref 60–?)
GLUCOSE BLD-MCNC: 117 MG/DL (ref 70–99)
GLUCOSE BLD-MCNC: 88 MG/DL (ref 70–99)
HCT VFR BLD AUTO: 34.2 %
HGB BLD-MCNC: 11.1 G/DL
IMM GRANULOCYTES # BLD AUTO: 0.01 X10(3) UL (ref 0–1)
IMM GRANULOCYTES NFR BLD: 0.2 %
LYMPHOCYTES # BLD AUTO: 1.7 X10(3) UL (ref 1–4)
LYMPHOCYTES NFR BLD AUTO: 32.9 %
MCH RBC QN AUTO: 29.8 PG (ref 26–34)
MCHC RBC AUTO-ENTMCNC: 32.5 G/DL (ref 31–37)
MCV RBC AUTO: 91.9 FL
MONOCYTES # BLD AUTO: 0.51 X10(3) UL (ref 0.1–1)
MONOCYTES NFR BLD AUTO: 9.9 %
NEUTROPHILS # BLD AUTO: 2.74 X10 (3) UL (ref 1.5–7.7)
NEUTROPHILS # BLD AUTO: 2.74 X10(3) UL (ref 1.5–7.7)
NEUTROPHILS NFR BLD AUTO: 53.1 %
OSMOLALITY SERPL CALC.SUM OF ELEC: 288 MOSM/KG (ref 275–295)
PLATELET # BLD AUTO: 195 10(3)UL (ref 150–450)
POTASSIUM SERPL-SCNC: 4 MMOL/L (ref 3.5–5.1)
POTASSIUM SERPL-SCNC: 4 MMOL/L (ref 3.5–5.1)
RBC # BLD AUTO: 3.72 X10(6)UL
SODIUM SERPL-SCNC: 139 MMOL/L (ref 136–145)
WBC # BLD AUTO: 5.2 X10(3) UL (ref 4–11)

## 2023-05-31 PROCEDURE — 93306 TTE W/DOPPLER COMPLETE: CPT | Performed by: HOSPITALIST

## 2023-05-31 PROCEDURE — 99232 SBSQ HOSP IP/OBS MODERATE 35: CPT | Performed by: HOSPITALIST

## 2023-05-31 PROCEDURE — 70553 MRI BRAIN STEM W/O & W/DYE: CPT | Performed by: NURSE PRACTITIONER

## 2023-05-31 PROCEDURE — 99232 SBSQ HOSP IP/OBS MODERATE 35: CPT | Performed by: OTHER

## 2023-05-31 RX ORDER — ACETAMINOPHEN 325 MG/1
650 TABLET ORAL EVERY 6 HOURS PRN
Status: DISCONTINUED | OUTPATIENT
Start: 2023-05-31 | End: 2023-06-01

## 2023-05-31 RX ORDER — GADOTERATE MEGLUMINE 376.9 MG/ML
15 INJECTION INTRAVENOUS
Status: COMPLETED | OUTPATIENT
Start: 2023-05-31 | End: 2023-05-31

## 2023-05-31 RX ORDER — IBUPROFEN 400 MG/1
400 TABLET ORAL EVERY 6 HOURS PRN
Status: DISCONTINUED | OUTPATIENT
Start: 2023-05-31 | End: 2023-06-01

## 2023-05-31 RX ORDER — LORAZEPAM 2 MG/ML
1 INJECTION INTRAMUSCULAR ONCE
Status: COMPLETED | OUTPATIENT
Start: 2023-05-31 | End: 2023-05-31

## 2023-05-31 RX ORDER — LORAZEPAM 2 MG/ML
INJECTION INTRAMUSCULAR
Status: COMPLETED
Start: 2023-05-31 | End: 2023-05-31

## 2023-05-31 RX ORDER — ASPIRIN 325 MG
325 TABLET, DELAYED RELEASE (ENTERIC COATED) ORAL DAILY
Status: DISCONTINUED | OUTPATIENT
Start: 2023-05-31 | End: 2023-06-01

## 2023-05-31 RX ORDER — ATORVASTATIN CALCIUM 40 MG/1
40 TABLET, FILM COATED ORAL NIGHTLY
Status: DISCONTINUED | OUTPATIENT
Start: 2023-05-31 | End: 2023-06-01

## 2023-05-31 NOTE — PLAN OF CARE
Pt AxO4  RA  NSR  Pain present; headache  -mild; motrin gives some relief  1x assist    ECHO completed  MRI completed  Monitor/manage BP level  Pt informed of POC, all questions answered

## 2023-05-31 NOTE — SIGNIFICANT EVENT
Neurology Significant Event Note      Called by RN due to patient informing him of her \"face feeling weird\" with twitching, then having twitching on right arm. Shortly after, patient was breathing deeply and rapidly and RN called rapid response. When arriving to RRT, patient HR was in 160s-170s and patient crying and wailing with uncontrollable full body jerking with repeated leaning to the right (bending at the waist) and right arm shaking. Patient at that time was able to track with eyes and respond to questions/follow commands. However, within 5 minutes of talking with patient, she was not responsive and SpO2 decreased to 85%. At that time, ativan 1 mg administered and patient alert and decreased jerking. Patient able to return back to baseline mentation and oxygenation within 30 seconds and drink some water. There was no postictal period noted. She states that his has never happened to her before and she was unable to control the jerking during this episode. Patient still with left sided facial weakness and left tongue deviation (RN stated that patient sometimes has right deviation and sometimes left deviation of tongue). Witnessed jerking was not consistent with seizure activity as patient was able to follow commands, have conversation, and answer questions appropriately. MRI still pending, but RN able to expedite this at this time to be completed tonight. Dr. Newton Jones and stroke minnie presented to bedside during episode and were briefed on the patient's background and current findings.        LARRY Kwan  [facility] [id number]

## 2023-05-31 NOTE — PLAN OF CARE
NURSING ADMISSION NOTE      Assumed care of patient when transferred from ED. Admission Navigator completed with patient. A/O x 4. SPO2 maintained on RA. NIH 2 including left facial droop and LUE tingling improving per pt. Neurology consulted. Declines ACCU checks. Pt updated on POC. Patient admitted via [address] to room. Safety precautions initiated. Bed in low position. Call light in reach. Problem: Patient/Family Goals  Goal: Patient/Family Long Term Goal  Description: Patient's Long Term Goal: Neuro symptoms resolve    Interventions:  - Monitor vital signs, lab and diagnostic test results  - Consult to Neuro and speech  - Prescriptions prescribed for discharge  - See additional Care Plan goals for specific interventions  Outcome: Progressing  Goal: Patient/Family Short Term Goal  Description: Patient's Short Term Goal: Discharge home    Interventions:   - Monitor vital signs, lab and diagnostic test results  - Consult to Neuro and speech  - Prescriptions prescribed for discharge  - See additional Care Plan goals for specific interventions  Outcome: Progressing     Problem: NEUROLOGICAL - ADULT  Goal: Achieves stable or improved neurological status  Description: INTERVENTIONS  - Assess for and report changes in neurological status  - Initiate measures to prevent increased intracranial pressure  - Maintain blood pressure and fluid volume within ordered parameters to optimize cerebral perfusion and minimize risk of hemorrhage  - Monitor temperature, glucose, and sodium.  Initiate appropriate interventions as ordered  Outcome: Progressing  Goal: Achieves maximal functionality and self care  Description: INTERVENTIONS  - Monitor swallowing and airway patency with patient fatigue and changes in neurological status  - Encourage and assist patient to increase activity and self care with guidance from PT/OT  - Encourage visually impaired, hearing impaired and aphasic patients to use assistive/communication devices  - Allow adequate time to patient to eat and drink sitting in upright position.  - Place call light, personal belongings and bed side table on right side  Outcome: Progressing

## 2023-05-31 NOTE — PLAN OF CARE
2230 Patient's BP low 84/51, received n/o for LR bolus. BPs did improve. Patient doing well, still has facial droop to left side of face and tongue deviates to the right, but walking well to the restroom and overall independent. Patient is refusing any procedures with needles at this time. No, accu checks and no lovenox.

## 2023-06-01 ENCOUNTER — NURSE ONLY (OUTPATIENT)
Dept: ELECTROPHYSIOLOGY | Facility: HOSPITAL | Age: 53
End: 2023-06-01
Attending: NURSE PRACTITIONER
Payer: COMMERCIAL

## 2023-06-01 VITALS
WEIGHT: 121.69 LBS | SYSTOLIC BLOOD PRESSURE: 104 MMHG | DIASTOLIC BLOOD PRESSURE: 40 MMHG | OXYGEN SATURATION: 95 % | TEMPERATURE: 98 F | RESPIRATION RATE: 18 BRPM | HEART RATE: 82 BPM | BODY MASS INDEX: 22 KG/M2

## 2023-06-01 PROCEDURE — 90792 PSYCH DIAG EVAL W/MED SRVCS: CPT | Performed by: PHYSICIAN ASSISTANT

## 2023-06-01 PROCEDURE — 99233 SBSQ HOSP IP/OBS HIGH 50: CPT | Performed by: OTHER

## 2023-06-01 PROCEDURE — 99239 HOSP IP/OBS DSCHRG MGMT >30: CPT | Performed by: HOSPITALIST

## 2023-06-01 PROCEDURE — 95819 EEG AWAKE AND ASLEEP: CPT | Performed by: OTHER

## 2023-06-01 RX ORDER — ASPIRIN 325 MG
325 TABLET, DELAYED RELEASE (ENTERIC COATED) ORAL DAILY
Qty: 30 TABLET | Refills: 2 | Status: SHIPPED | OUTPATIENT
Start: 2023-06-02

## 2023-06-01 RX ORDER — ATORVASTATIN CALCIUM 40 MG/1
40 TABLET, FILM COATED ORAL NIGHTLY
Qty: 30 TABLET | Refills: 2 | Status: SHIPPED | OUTPATIENT
Start: 2023-06-01

## 2023-06-01 NOTE — PLAN OF CARE
Assumed patient care approximately 1930  Family at bedside  Patient alert and orientated x 4  VSS. Afebrile. Blood pressures on the low side  Neuro assessment completed- see flow sheets  Patient reports slight dizziness resolving overnight  Headache reported but patient declined medication  Refused Lovenox injection due to \"no needle sticks\"  Refused Lipitor due to \"cholesterol was not checked with my labs\"  Education on these medications reinforced with patient   Approximately 0698 patient developed facial twitching and gross right upper body jerking with elevated HR lasting a few minutes. This was partially witnessed. O2 applied and reassurance and calming techniques utilized. Patient stated left arm tingling. Patient was conscious and able to communicate, no post ictal symptoms noted. Charge RN and MD notified.  No interventions or new orders  Plan of care reviewed  All safety precautions maintained      Problem: PAIN - ADULT  Goal: Verbalizes/displays adequate comfort level or patient's stated pain goal  Description: INTERVENTIONS:  - Encourage pt to monitor pain and request assistance  - Assess pain using appropriate pain scale  - Administer analgesics based on type and severity of pain and evaluate response  - Implement non-pharmacological measures as appropriate and evaluate response  - Consider cultural and social influences on pain and pain management  - Manage/alleviate anxiety  - Utilize distraction and/or relaxation techniques  - Monitor for opioid side effects  - Notify MD/LIP if interventions unsuccessful or patient reports new pain  - Anticipate increased pain with activity and pre-medicate as appropriate  Outcome: Progressing     Problem: NEUROLOGICAL - ADULT  Goal: Achieves stable or improved neurological status  Description: INTERVENTIONS  - Assess for and report changes in neurological status  - Initiate measures to prevent increased intracranial pressure  - Maintain blood pressure and fluid volume within ordered parameters to optimize cerebral perfusion and minimize risk of hemorrhage  - Monitor temperature, glucose, and sodium.  Initiate appropriate interventions as ordered  Outcome: Progressing     Problem: Patient/Family Goals  Goal: Patient/Family Long Term Goal  Description: Patient's Long Term Goal: Neuro symptoms resolve    Interventions:  - Monitor vital signs, lab and diagnostic test results  - Consult to Neuro and speech  - Prescriptions prescribed for discharge  - See additional Care Plan goals for specific interventions  Outcome: Progressing

## 2023-06-01 NOTE — SLP NOTE
Skilled assessment of cognition/communication is not warranted as brain imaging does not reveal acute infarct. Will discharge from speech/swallowing therapy.

## 2023-06-01 NOTE — PLAN OF CARE
NURSING DISCHARGE NOTE    Discharged Home via Wheelchair. Accompanied by RN and Support staff  Belongings Taken by patient/family. Pt AxO4  RA  NSR  No pain at this time  1x assist/standby    EEG completed  Psychiatry c/s  Monitor/manage BP level  Pt informed of POC, all questions answered    Pt given and informed of discharge instructions, new medications, home medications, medications to stop, and f/u dates. Pt given further education handouts related to admission. Pt given work letter and when to return to work. Pt informed of importance of f/u with neurology at discharge. Pt verbally understands discharge instructions, all questions answered. Problem: Patient/Family Goals  Goal: Patient/Family Long Term Goal  Description: Patient's Long Term Goal: Neuro symptoms resolve    Interventions:  - Monitor vital signs, lab and diagnostic test results  - Consult to Neuro and speech  - Prescriptions prescribed for discharge  - See additional Care Plan goals for specific interventions  Outcome: Adequate for Discharge  Goal: Patient/Family Short Term Goal  Description: Patient's Short Term Goal: Discharge home    Interventions:   - Monitor vital signs, lab and diagnostic test results  - Consult to Neuro and speech  - Prescriptions prescribed for discharge  - See additional Care Plan goals for specific interventions  Outcome: Adequate for Discharge     Problem: NEUROLOGICAL - ADULT  Goal: Achieves stable or improved neurological status  Description: INTERVENTIONS  - Assess for and report changes in neurological status  - Initiate measures to prevent increased intracranial pressure  - Maintain blood pressure and fluid volume within ordered parameters to optimize cerebral perfusion and minimize risk of hemorrhage  - Monitor temperature, glucose, and sodium.  Initiate appropriate interventions as ordered  Outcome: Adequate for Discharge  Goal: Achieves maximal functionality and self care  Description: INTERVENTIONS  - Monitor swallowing and airway patency with patient fatigue and changes in neurological status  - Encourage and assist patient to increase activity and self care with guidance from PT/OT  - Encourage visually impaired, hearing impaired and aphasic patients to use assistive/communication devices  - Allow adequate time to patient to eat and drink sitting in upright position.  - Place call light, personal belongings and bed side table on right side  Outcome: Adequate for Discharge     Problem: PAIN - ADULT  Goal: Verbalizes/displays adequate comfort level or patient's stated pain goal  Description: INTERVENTIONS:  - Encourage pt to monitor pain and request assistance  - Assess pain using appropriate pain scale  - Administer analgesics based on type and severity of pain and evaluate response  - Implement non-pharmacological measures as appropriate and evaluate response  - Consider cultural and social influences on pain and pain management  - Manage/alleviate anxiety  - Utilize distraction and/or relaxation techniques  - Monitor for opioid side effects  - Notify MD/LIP if interventions unsuccessful or patient reports new pain  - Anticipate increased pain with activity and pre-medicate as appropriate  Outcome: Adequate for Discharge

## 2023-06-01 NOTE — PROCEDURES
.  EEG REPORT;    Reason for Examination: Convulsions. Technical Summary:   18 Channels of EEG and 1 Channel of EKG was performed utilizing internation 10/20 method. Background Activity: The background activity consisted of 10 hz waveforms, reactive to eye opening/ external stimulation. Activation:    Hyperventilation:   Not performed. Photic Stimulation:  No driving response seen. Sleep:  Stage I sleep seen. Impression:  Normal EEG. No focal, lateralized or generalized epileptiform activity seen. Kasey Amato MD  [facility].

## 2023-06-02 ENCOUNTER — PATIENT OUTREACH (OUTPATIENT)
Dept: CASE MANAGEMENT | Age: 53
End: 2023-06-02

## 2023-06-06 ENCOUNTER — OFFICE VISIT (OUTPATIENT)
Dept: NEUROLOGY | Facility: CLINIC | Age: 53
End: 2023-06-06
Payer: COMMERCIAL

## 2023-06-06 VITALS
SYSTOLIC BLOOD PRESSURE: 115 MMHG | HEART RATE: 68 BPM | DIASTOLIC BLOOD PRESSURE: 65 MMHG | BODY MASS INDEX: 23 KG/M2 | WEIGHT: 126.19 LBS | RESPIRATION RATE: 16 BRPM

## 2023-06-06 DIAGNOSIS — R29.810 FACIAL DROOP: ICD-10-CM

## 2023-06-06 DIAGNOSIS — R20.8 BURNING SENSATION OF MOUTH: Primary | ICD-10-CM

## 2023-06-06 PROCEDURE — 3074F SYST BP LT 130 MM HG: CPT | Performed by: OTHER

## 2023-06-06 PROCEDURE — 3078F DIAST BP <80 MM HG: CPT | Performed by: OTHER

## 2023-06-06 PROCEDURE — 99214 OFFICE O/P EST MOD 30 MIN: CPT | Performed by: OTHER

## 2023-06-06 RX ORDER — GABAPENTIN 100 MG/1
300 CAPSULE ORAL NIGHTLY
Qty: 90 CAPSULE | Refills: 2 | Status: SHIPPED | OUTPATIENT
Start: 2023-06-06

## 2023-06-06 NOTE — PROGRESS NOTES
Pt reports she has episodes of facial numbness, particularly around her mouth and her tongue. She is unsure how frequently she has these episodes. States that she went to ER due to her tongue pushing to one side and facial droop. Pt denies any recurrence of symptoms since her IP stay. Pt reports she has not picked up prescriptions.

## 2023-08-01 ENCOUNTER — TELEPHONE (OUTPATIENT)
Dept: NEUROLOGY | Facility: CLINIC | Age: 53
End: 2023-08-01

## 2023-08-01 NOTE — TELEPHONE ENCOUNTER
S: Twitching and numbness. B: LOV 6/6/2023    1. Abnormal MRI  - Repeat MRI brain reviewed  - Start gabapenitn for perioral burning pains    A: Patient states she started to feel twitching yesterday on the right side of her face and then it  spread to the left side. Twitching stopped but right side of face feels numb today. No other symptoms. R: Message routed to provider.

## 2023-08-09 ENCOUNTER — TELEPHONE (OUTPATIENT)
Dept: NEUROLOGY | Facility: CLINIC | Age: 53
End: 2023-08-09

## 2023-08-09 ENCOUNTER — TELEPHONE (OUTPATIENT)
Dept: FAMILY MEDICINE CLINIC | Facility: CLINIC | Age: 53
End: 2023-08-09

## 2023-08-09 NOTE — TELEPHONE ENCOUNTER
Pt has had leg cramping for a few days. She thinks her potassium is low. She doesn't feel right. Yesterday she was working in the yard & when she came in she was light headed. She woke up on the floor.

## 2023-08-09 NOTE — TELEPHONE ENCOUNTER
Patient advised per her PCP she should be seen. Patient refused appointment at primary care. They advised to seek treatment in IC. Patient advised the same for nausea and dizzy ness from yesterday. Patient verbalized understanding.

## 2023-08-09 NOTE — TELEPHONE ENCOUNTER
Pt has had issues with her face in the past. Yesterday she was working in the yard in the morning. She went inside, felt light headed and nauseous. She fainted, but doesn't know how long she was out. Today she is at work and feels nauseous and dizzy. Please advise, Pt's best call back number is [phone]. Endorsed to RN for Provider.

## 2023-08-09 NOTE — TELEPHONE ENCOUNTER
Patient called had an episode of syncope after working in yard, she woke up on the kitchen floor with her dog licking her face. Does not know the timeline. She feels her potassium is low. She is also c/o leg cramping and right hip pain. She is currently at work and feeling ok. Please advise.

## 2023-08-09 NOTE — TELEPHONE ENCOUNTER
Sounds like she needs to be seen.   She can go to the urgent care or next available appointment with Ekta Morse

## 2023-09-25 ENCOUNTER — TELEPHONE (OUTPATIENT)
Dept: FAMILY MEDICINE CLINIC | Facility: CLINIC | Age: 53
End: 2023-09-25

## 2023-09-25 NOTE — TELEPHONE ENCOUNTER
Spoke with patient - can only see Dr. Tamera Valdez on Tuesdays. No available appointments tomorrow. Offered to see nurse practitioner - does not want to see her/ \"I want to see a doctor\"  Primary issue to be addressed is pelvic issues per patient.     Future Appointments   Date Time Provider Denise Taylor   10/3/2023  3:30 PM Maddie Herrera., DO [facility] [place]   1/9/2024 11:20 AM Genaro Daniels, DO [facility] [id number]

## 2023-09-25 NOTE — TELEPHONE ENCOUNTER
Spoke with patient. Patient states that she is having issues in her vaginal area. Redness and irritation. She would like evaluated. Right side of face including her lips are numb. Patient has seen Dr. Danica Torrez for this in the past.   Symptoms started about 2 weeks ago and are intermittent. Face is twitching intermittently also. She denies weakness in her extremities. Headache yesterday that resolved but continues to have intermittent sharp pains hin her head. Feet are tingling intermittently. She states that she is unable to get into her neurologist and would like to see Dr. Daina Esquivel for this issue. Advised that patient should call her neurologist for a sooner appointment. She states that she is already on a wait list.  Soonest available appointment is 1/9. Patient is asking to be worked in to Dr. May Quevedo schedule tomorrow as she ha to work everyday during the week but Tuesday and Sunday.

## 2023-09-25 NOTE — TELEPHONE ENCOUNTER
I can see her for her pelvic issues this week if she is unable to get in with her gynecologist.  She should follow-up with her neurologist for her various ongoing neurologic symptoms. That does not sound like the widespread symptoms are going to be able to be addressed in a 15-minute appointment along with her pelvic symptoms. She may follow-up later for those symptoms if she would like.   30-minute appointment

## 2023-09-25 NOTE — TELEPHONE ENCOUNTER
PT. WANTS TO SEE DR. AHN FOR FLARE UP IN HER PRIVATE PARTS AND OTHER ISSUES. I TRIED TO SCHEDULE HER WED. AND SHE SAID SHE CANNOT BECAUSE SHE WORKS BUT SHE WANTS TO BE SEEN TOMORROW BUT SHE REFUSED TO SEE JENNY OR ABIOLA.

## 2023-10-19 RX ORDER — VALACYCLOVIR HYDROCHLORIDE 500 MG/1
500 TABLET, FILM COATED ORAL 2 TIMES DAILY
Qty: 30 TABLET | Refills: 0 | Status: SHIPPED | OUTPATIENT
Start: 2023-10-19

## 2023-10-19 NOTE — TELEPHONE ENCOUNTER
The original prescription was discontinued on 5/30/2023 by Monie Gordon, RN. Renewing this prescription may not be appropriate.      Valacyclovir 500 MG oral tab      Herpes Agent Protocol [id number]/18/2023 08:13 AM    Appointment in the past 12 or next 3 months        Last office visit:  11/6/21       Future Appointments   Date Time Provider Denise Taylor   10/24/2023 10:00 AM Leeanne Miller DO [facility] [place]   1/9/2024 11:20 AM DO URSULA Jha [id number]   Last filled:  4/18/23  #30 with 0 refills   Last labs:

## 2023-10-24 ENCOUNTER — OFFICE VISIT (OUTPATIENT)
Dept: FAMILY MEDICINE CLINIC | Facility: CLINIC | Age: 53
End: 2023-10-24

## 2023-10-24 VITALS
HEART RATE: 83 BPM | HEIGHT: 62 IN | RESPIRATION RATE: 12 BRPM | TEMPERATURE: 98 F | WEIGHT: 128 LBS | DIASTOLIC BLOOD PRESSURE: 70 MMHG | OXYGEN SATURATION: 99 % | SYSTOLIC BLOOD PRESSURE: 110 MMHG | BODY MASS INDEX: 23.55 KG/M2

## 2023-10-24 DIAGNOSIS — N84.1 CERVICAL POLYP: Primary | ICD-10-CM

## 2023-10-24 DIAGNOSIS — N95.2 ATROPHIC VAGINITIS: ICD-10-CM

## 2023-10-24 DIAGNOSIS — S76.012A STRAIN OF FLEXOR MUSCLE OF LEFT HIP, INITIAL ENCOUNTER: ICD-10-CM

## 2023-10-24 DIAGNOSIS — N92.4 ABNORMAL PERIMENOPAUSAL BLEEDING: ICD-10-CM

## 2023-10-24 DIAGNOSIS — R30.9 PAINFUL URINATION: ICD-10-CM

## 2023-10-24 LAB
APPEARANCE: CLEAR
BILIRUBIN: NEGATIVE
GLUCOSE (URINE DIPSTICK): NEGATIVE MG/DL
KETONES (URINE DIPSTICK): NEGATIVE MG/DL
LEUKOCYTES: NEGATIVE
MULTISTIX LOT#: ABNORMAL NUMERIC
NITRITE, URINE: NEGATIVE
PH, URINE: 5.5 (ref 4.5–8)
PROTEIN (URINE DIPSTICK): NEGATIVE MG/DL
SPECIFIC GRAVITY: 1 (ref 1–1.03)
URINE-COLOR: YELLOW
UROBILINOGEN,SEMI-QN: 0.2 MG/DL (ref 0–1.9)

## 2023-10-24 PROCEDURE — 87086 URINE CULTURE/COLONY COUNT: CPT | Performed by: FAMILY MEDICINE

## 2023-10-24 PROCEDURE — 3074F SYST BP LT 130 MM HG: CPT | Performed by: FAMILY MEDICINE

## 2023-10-24 PROCEDURE — 99214 OFFICE O/P EST MOD 30 MIN: CPT | Performed by: FAMILY MEDICINE

## 2023-10-24 PROCEDURE — 3008F BODY MASS INDEX DOCD: CPT | Performed by: FAMILY MEDICINE

## 2023-10-24 PROCEDURE — 81003 URINALYSIS AUTO W/O SCOPE: CPT | Performed by: FAMILY MEDICINE

## 2023-10-24 PROCEDURE — 3078F DIAST BP <80 MM HG: CPT | Performed by: FAMILY MEDICINE

## 2023-10-24 RX ORDER — VALACYCLOVIR HYDROCHLORIDE 500 MG/1
500 TABLET, FILM COATED ORAL 2 TIMES DAILY
Qty: 30 TABLET | Refills: 0 | Status: SHIPPED | OUTPATIENT
Start: 2023-10-24

## 2023-10-24 NOTE — PATIENT INSTRUCTIONS
I discussed the cause of hip discomfort as it relates to a hip flexor strain. Recommend moist heat. Discussed proper stretching  I advised patient that I do not see anything of concern in the left vulvar region. It appears there may have been a recent cyst that has ruptured. No evidence of herpes or other potentially sexually transmitted diseases  I discussed the presence of cervical polyp as it relates to perimenopausal bleeding. Would recommend gynecology opinion for cervical polypectomy. I discussed signs and symptoms of perimenopause including vaginal atrophy and effects on urination. We will obtain urine for culture.   Hold antibiotics pending results on the discharge service for the patient. I have reviewed and made amendments to the documentation where necessary.

## 2023-12-20 ENCOUNTER — TELEPHONE (OUTPATIENT)
Dept: FAMILY MEDICINE CLINIC | Facility: CLINIC | Age: 53
End: 2023-12-20

## 2023-12-20 ENCOUNTER — HOSPITAL ENCOUNTER (EMERGENCY)
Facility: HOSPITAL | Age: 53
Discharge: HOME | End: 2023-12-20
Attending: EMERGENCY MEDICINE
Payer: COMMERCIAL

## 2023-12-20 ENCOUNTER — APPOINTMENT (OUTPATIENT)
Dept: MRI IMAGING | Facility: HOSPITAL | Age: 53
End: 2023-12-20
Attending: EMERGENCY MEDICINE
Payer: COMMERCIAL

## 2023-12-20 VITALS
WEIGHT: 120 LBS | HEIGHT: 62 IN | TEMPERATURE: 98 F | OXYGEN SATURATION: 100 % | HEART RATE: 72 BPM | BODY MASS INDEX: 22.08 KG/M2 | RESPIRATION RATE: 18 BRPM | SYSTOLIC BLOOD PRESSURE: 103 MMHG | DIASTOLIC BLOOD PRESSURE: 60 MMHG

## 2023-12-20 DIAGNOSIS — R27.0 ATAXIA: Primary | ICD-10-CM

## 2023-12-20 DIAGNOSIS — R42 DIZZINESS: ICD-10-CM

## 2023-12-20 LAB
ALBUMIN SERPL-MCNC: 3.9 G/DL (ref 3.4–5)
ALBUMIN/GLOB SERPL: 1 {RATIO} (ref 1–2)
ALP LIVER SERPL-CCNC: 49 U/L
ALT SERPL-CCNC: 33 U/L
ANION GAP SERPL CALC-SCNC: 4 MMOL/L (ref 0–18)
AST SERPL-CCNC: 24 U/L (ref 15–37)
BASOPHILS # BLD AUTO: 0.03 X10(3) UL (ref 0–0.2)
BASOPHILS NFR BLD AUTO: 0.4 %
BILIRUB SERPL-MCNC: 0.4 MG/DL (ref 0.1–2)
BUN BLD-MCNC: 13 MG/DL (ref 9–23)
CALCIUM BLD-MCNC: 9.1 MG/DL (ref 8.5–10.1)
CHLORIDE SERPL-SCNC: 107 MMOL/L (ref 98–112)
CO2 SERPL-SCNC: 27 MMOL/L (ref 21–32)
CREAT BLD-MCNC: 0.68 MG/DL
EGFRCR SERPLBLD CKD-EPI 2021: 104 ML/MIN/1.73M2 (ref 60–?)
EOSINOPHIL # BLD AUTO: 0.29 X10(3) UL (ref 0–0.7)
EOSINOPHIL NFR BLD AUTO: 3.5 %
ERYTHROCYTE [DISTWIDTH] IN BLOOD BY AUTOMATED COUNT: 13.3 %
GLOBULIN PLAS-MCNC: 3.8 G/DL (ref 2.8–4.4)
GLUCOSE BLD-MCNC: 99 MG/DL (ref 70–99)
HCT VFR BLD AUTO: 38.6 %
HGB BLD-MCNC: 12.6 G/DL
IMM GRANULOCYTES # BLD AUTO: 0.02 X10(3) UL (ref 0–1)
IMM GRANULOCYTES NFR BLD: 0.2 %
LYMPHOCYTES # BLD AUTO: 2.72 X10(3) UL (ref 1–4)
LYMPHOCYTES NFR BLD AUTO: 32.5 %
MCH RBC QN AUTO: 29.6 PG (ref 26–34)
MCHC RBC AUTO-ENTMCNC: 32.6 G/DL (ref 31–37)
MCV RBC AUTO: 90.6 FL
MONOCYTES # BLD AUTO: 0.57 X10(3) UL (ref 0.1–1)
MONOCYTES NFR BLD AUTO: 6.8 %
NEUTROPHILS # BLD AUTO: 4.73 X10 (3) UL (ref 1.5–7.7)
NEUTROPHILS # BLD AUTO: 4.73 X10(3) UL (ref 1.5–7.7)
NEUTROPHILS NFR BLD AUTO: 56.6 %
OSMOLALITY SERPL CALC.SUM OF ELEC: 286 MOSM/KG (ref 275–295)
PLATELET # BLD AUTO: 245 10(3)UL (ref 150–450)
POTASSIUM SERPL-SCNC: 3.6 MMOL/L (ref 3.5–5.1)
PROT SERPL-MCNC: 7.7 G/DL (ref 6.4–8.2)
RBC # BLD AUTO: 4.26 X10(6)UL
SODIUM SERPL-SCNC: 138 MMOL/L (ref 136–145)
WBC # BLD AUTO: 8.4 X10(3) UL (ref 4–11)

## 2023-12-20 PROCEDURE — 93005 ELECTROCARDIOGRAM TRACING: CPT

## 2023-12-20 PROCEDURE — 80053 COMPREHEN METABOLIC PANEL: CPT | Performed by: EMERGENCY MEDICINE

## 2023-12-20 PROCEDURE — 99285 EMERGENCY DEPT VISIT HI MDM: CPT

## 2023-12-20 PROCEDURE — 99284 EMERGENCY DEPT VISIT MOD MDM: CPT

## 2023-12-20 PROCEDURE — 70544 MR ANGIOGRAPHY HEAD W/O DYE: CPT | Performed by: EMERGENCY MEDICINE

## 2023-12-20 PROCEDURE — 70547 MR ANGIOGRAPHY NECK W/O DYE: CPT | Performed by: EMERGENCY MEDICINE

## 2023-12-20 PROCEDURE — 70551 MRI BRAIN STEM W/O DYE: CPT | Performed by: EMERGENCY MEDICINE

## 2023-12-20 PROCEDURE — 93010 ELECTROCARDIOGRAM REPORT: CPT

## 2023-12-20 PROCEDURE — 85025 COMPLETE CBC W/AUTO DIFF WBC: CPT | Performed by: EMERGENCY MEDICINE

## 2023-12-20 PROCEDURE — 36415 COLL VENOUS BLD VENIPUNCTURE: CPT

## 2023-12-20 RX ORDER — ONDANSETRON 2 MG/ML
4 INJECTION INTRAMUSCULAR; INTRAVENOUS ONCE
Status: DISCONTINUED | OUTPATIENT
Start: 2023-12-20 | End: 2023-12-20

## 2023-12-20 NOTE — DISCHARGE INSTRUCTIONS
Return if worsening or changing symptoms. Follow-up with your neurologist in the next week. Continue medications. Would restart your aspirin until you see Dr. Melvi Higginbotham.

## 2023-12-20 NOTE — ED INITIAL ASSESSMENT (HPI)
Sharp headache to top of head x 2 weeks, today pain started at 0330. C/o right sided facial numbness intermittent with headache. Left sided facial droop noted in triage.

## 2023-12-20 NOTE — TELEPHONE ENCOUNTER
PC to pt again, n/a.     It appears pt is at THE [facility] [place] [facility] ER (per note in Replaced by [facility])

## 2023-12-21 ENCOUNTER — PATIENT OUTREACH (OUTPATIENT)
Dept: CASE MANAGEMENT | Age: 53
End: 2023-12-21

## 2023-12-21 LAB
ATRIAL RATE: 74 BPM
P AXIS: 48 DEGREES
P-R INTERVAL: 126 MS
Q-T INTERVAL: 408 MS
QRS DURATION: 86 MS
QTC CALCULATION (BEZET): 452 MS
R AXIS: 44 DEGREES
T AXIS: 45 DEGREES
VENTRICULAR RATE: 74 BPM

## 2023-12-21 NOTE — PROGRESS NOTES
1st attempt ER f/up apt request  No answer, LVMTCB to schedule apts  NEURO -existing apt (1/9/24)  PCP -unable to contact  Closing encounter

## 2024-01-08 ENCOUNTER — TELEPHONE (OUTPATIENT)
Dept: FAMILY MEDICINE CLINIC | Facility: CLINIC | Age: 54
End: 2024-01-08

## 2024-01-08 DIAGNOSIS — G51.0 FACIAL PARALYSIS: Primary | ICD-10-CM

## 2024-01-08 NOTE — TELEPHONE ENCOUNTER
SHE NEED A A REFERRAL FOR DR YESSENIA GILBERT (?)  AT THE [facility]   AND HER APPT IS FOR TOMORROW.  HER [facility] INS INFO HAS BEEN ENTERED    CALL HER AFTER IT HAS BEEN SENT

## 2024-02-09 NOTE — TELEPHONE ENCOUNTER
Elizabeth Lou, I would rather see patient in the office.   Please schedule 30-minute appointment

## 2024-03-05 ENCOUNTER — OFFICE VISIT (OUTPATIENT)
Dept: FAMILY MEDICINE CLINIC | Facility: CLINIC | Age: 54
End: 2024-03-05
Payer: COMMERCIAL

## 2024-03-05 VITALS
OXYGEN SATURATION: 100 % | RESPIRATION RATE: 18 BRPM | HEART RATE: 70 BPM | BODY MASS INDEX: 23.37 KG/M2 | HEIGHT: 62 IN | DIASTOLIC BLOOD PRESSURE: 72 MMHG | WEIGHT: 127 LBS | SYSTOLIC BLOOD PRESSURE: 118 MMHG | TEMPERATURE: 98 F

## 2024-03-05 DIAGNOSIS — M25.552 LEFT HIP PAIN: Primary | ICD-10-CM

## 2024-03-05 DIAGNOSIS — A60.04 HERPES SIMPLEX VULVOVAGINITIS: ICD-10-CM

## 2024-03-05 PROCEDURE — 99213 OFFICE O/P EST LOW 20 MIN: CPT | Performed by: FAMILY MEDICINE

## 2024-03-05 RX ORDER — VALACYCLOVIR HYDROCHLORIDE 500 MG/1
500 TABLET, FILM COATED ORAL DAILY
Qty: 90 TABLET | Refills: 1 | Status: SHIPPED | OUTPATIENT
Start: 2024-03-05

## 2024-03-05 RX ORDER — MELOXICAM 15 MG/1
15 TABLET ORAL DAILY
Qty: 90 TABLET | Refills: 0 | Status: SHIPPED | OUTPATIENT
Start: 2024-03-05

## 2024-03-05 NOTE — PROGRESS NOTES
Courtney Allison is a 53 year old female.  Chief Complaint   Patient presents with    Hip Pain     On going left hip pain        HPI:   C/o left hip pain, worse walking a lot, x several weeks, no known injury, stands a lot at work  Advil helps, pain has been present in the past but getting worse, hurts to lay on that side  Current Outpatient Medications   Medication Sig Dispense Refill    valACYclovir 500 MG Oral Tab Take 1 tablet (500 mg total) by mouth 2 (two) times daily. 30 tablet 0    gabapentin 100 MG Oral Cap Take 3 capsules (300 mg total) by mouth nightly. 90 capsule 2    atorvastatin 40 MG Oral Tab Take 1 tablet (40 mg total) by mouth nightly. 30 tablet 2    aspirin 325 MG Oral Tab EC Take 1 tablet (325 mg total) by mouth daily. (Patient not taking: Reported on 3/5/2024) 30 tablet 2    diphenhydrAMINE-APAP, sleep, (TYLENOL PM EXTRA STRENGTH OR) Take 1 tablet by mouth nightly as needed (for sleep). (Patient not taking: Reported on 6/6/2023)        Past Medical History:   Diagnosis Date    Allergic rhinitis, cause unspecified     COVID-19 virus infection 04/30/2020    Genital herpes     for less than or around 6 months      Social History:  Social History     Socioeconomic History    Marital status:    Tobacco Use    Smoking status: Never    Smokeless tobacco: Never   Vaping Use    Vaping Use: Never used   Substance and Sexual Activity    Alcohol use: No    Drug use: No    Sexual activity: Not Currently     Partners: Male   Other Topics Concern    Caffeine Concern Yes     Comment: coffee and tea daily    Exercise Yes     Comment: daily    Seat Belt Yes        REVIEW OF SYSTEMS:   GENERAL HEALTH: feels well otherwise, denies fatigue, appetite ok  SKIN: denies any unusual skin lesions or rashes  ENT: denies nasal congestion, pnd, sore throat, ear pain or pressure, decreased hearing  RESPIRATORY: denies shortness of breath with exertion,cough, wheezing  CARDIOVASCULAR: denies chest pain on exertion,  edema  GI: denies abdominal pain and denies heartburn  : gets genital lesions 1-2 x per month, lasts 1 week, valcyclovir is effective, pt requests a refill, no current lesions  NEURO: denies headaches  PSYCH: denies feeling stressed or anxious  MSK: see hpi  EXAM:   /72 (BP Location: Left arm, Patient Position: Sitting, Cuff Size: adult)   Pulse 70   Temp 97.7 °F (36.5 °C) (Temporal)   Resp 18   Ht 5' 2\" (1.575 m)   Wt 127 lb (57.6 kg)   LMP  (LMP Unknown)   SpO2 100%   BMI 23.23 kg/m²   GENERAL: well developed, well nourished,in no apparent distress, well hydrated  SKIN: no rashes,no suspicious lesions  ENT: TMs: intact, good mobility, Nose: turbinates clear, no dc, Throat: no erythema, pnd, or lesions  NECK: supple,no adenopathy,no bruits, no thyromegaly  LUNGS: clear to auscultation, no w/r/r  CARDIO: RRR without murmur, peripheral pulses intact  GI: good BS's,no masses, HSM or tenderness  Left hip: Flexion restricted at 90 degrees, pain with abduction and external rotation, no pain to palpation over the greater trochanter or along the ITB  Spinal flexion: Fingers to toes, no pain with extension past neutral spine, no pain to palpation over the left SI joint or piriformis  ASSESSMENT AND PLAN:     Encounter Diagnoses   Name Primary?    Left hip pain Yes    Herpes simplex vulvovaginitis      No orders of the defined types were placed in this encounter.    Meds & Refills for this Visit:  Requested Prescriptions     Signed Prescriptions Disp Refills    Meloxicam 15 MG Oral Tab 90 tablet 0     Sig: Take 1 tablet (15 mg total) by mouth daily.    valACYclovir 500 MG Oral Tab 90 tablet 1     Sig: Take 1 tablet (500 mg total) by mouth daily.     Imaging & Consults:  XR HIP + PELVIS MIN 4 VIEWS LEFT (CPT=73503)  No follow-ups on file.  Patient Instructions   I advised patient I would like her to follow-up for an x-ray.  I advised patient that x-ray services are not available until this afternoon in this  office.  She was asked to schedule for an x-ray at her earliest convenience  Will start meloxicam 15 mg every morning with first meal of the day.  Activity as tolerated  Would recommend starting daily antiviral suppressive therapy, valacyclovir 500 mg daily, may increase to twice daily dosing for 5 days with any flareups.  I asked patient to report the frequency of her flareups over the next 3 months   The patient indicates understanding of these issues and agrees to the plan.    Masoud Muñoz DO, FAAFP

## 2024-03-05 NOTE — PATIENT INSTRUCTIONS
I advised patient I would like her to follow-up for an x-ray.  I advised patient that x-ray services are not available until this afternoon in this office.  She was asked to schedule for an x-ray at her earliest convenience  Will start meloxicam 15 mg every morning with first meal of the day.  Activity as tolerated  Would recommend starting daily antiviral suppressive therapy, valacyclovir 500 mg daily, may increase to twice daily dosing for 5 days with any flareups.  I asked patient to report the frequency of her flareups over the next 3 months

## 2024-04-02 ENCOUNTER — OFFICE VISIT (OUTPATIENT)
Dept: NEUROLOGY | Facility: CLINIC | Age: 54
End: 2024-04-02
Payer: COMMERCIAL

## 2024-04-02 VITALS
DIASTOLIC BLOOD PRESSURE: 67 MMHG | HEART RATE: 68 BPM | WEIGHT: 126.31 LBS | SYSTOLIC BLOOD PRESSURE: 109 MMHG | BODY MASS INDEX: 23 KG/M2 | RESPIRATION RATE: 16 BRPM

## 2024-04-02 DIAGNOSIS — G51.31 HEMIFACIAL SPASM OF RIGHT SIDE OF FACE: Primary | ICD-10-CM

## 2024-04-02 PROCEDURE — 99213 OFFICE O/P EST LOW 20 MIN: CPT | Performed by: OTHER

## 2024-04-02 NOTE — PROGRESS NOTES
Neurology H&P    Courtney Allison Patient Status:  No patient class for patient encounter    10/9/1970 MRN [id number]   Location [facility], [address] [place] [address] Attending No att. providers found   Hosp Day # 0 PCP Masoud Muñoz,      Subjective:  Initial Hospital HPI 22    Courtney Allison is a(n) 51 year old female who presented to the ED with facial paresthesias. She states that she awoke yesterday with feelings of R sided facial swelling. She kaba bnot really describe any weakness but felt like her face wasn't right. She had some numbness and tingling around her lips and does endorse that at one point the entire R side of her face felt numb. These symptoms resolve. She cam to the ED and had an MRI brain which as noted below demonstrated numerous T2/FLAIR changes int he bilateral subcortical white matter areas. She also had 2 small enhancing lesions in the L kelby and midline kelby. Her sons in the room today states that they have never noted any facial weakness at all. She denies any current numbness, weakness or tingling No double vision or vision changes. No painful eye movement. No UE or LE weakness. No problems with gait. She reports that she has never had any similar symptoms in the past and otherwise feels well today. No FH or MS. No recent illness.      Interim History:  Pt was last seen in the hospital on 23. She comes back to the clinic today for several symptoms She states gets numbness on the R side of the face. She states that sometimes she gets a burning sensation on the top of her head. She also sometimes gets a \"twitching\" on the R side of the face. These are not new symptoms. She has had these same symptoms for a couple years. She has had an extensive work up for these symptoms. She stopped gabapentin. She states that she has no new numbness, weakness or tingling. No arm or leg weakness.    Current Medications:  Current Outpatient Medications   Medication  Sig Dispense Refill    Meloxicam 15 MG Oral Tab Take 1 tablet (15 mg total) by mouth daily. 90 tablet 0    valACYclovir 500 MG Oral Tab Take 1 tablet (500 mg total) by mouth daily. 90 tablet 1    gabapentin 100 MG Oral Cap Take 3 capsules (300 mg total) by mouth nightly. 90 capsule 2    atorvastatin 40 MG Oral Tab Take 1 tablet (40 mg total) by mouth nightly. 30 tablet 2    aspirin 325 MG Oral Tab EC Take 1 tablet (325 mg total) by mouth daily. (Patient not taking: Reported on 3/5/2024) 30 tablet 2    diphenhydrAMINE-APAP, sleep, (TYLENOL PM EXTRA STRENGTH OR) Take 1 tablet by mouth nightly as needed (for sleep). (Patient not taking: Reported on 2023)         Problem List:  Patient Active Problem List   Diagnosis    History of  novel coronavirus disease (COVID-19)    Condyloma acuminatum of vulva    Genital herpes simplex    Abnormal MRI of head    Facial droop    Convulsions (HCC)    Burning sensation of mouth    Psychiatric pseudoseizure       PMHx:  Past Medical History:   Diagnosis Date    Allergic rhinitis, cause unspecified     COVID-19 virus infection 2020    Genital herpes     for less than or around 6 months       PSHx:  Past Surgical History:   Procedure Laterality Date          X2    LIGATE FALLOPIAN TUBE         SocHx:  Social History     Socioeconomic History    Marital status:    Tobacco Use    Smoking status: Never    Smokeless tobacco: Never   Vaping Use    Vaping Use: Never used   Substance and Sexual Activity    Alcohol use: No    Drug use: No    Sexual activity: Not Currently     Partners: Male   Other Topics Concern    Caffeine Concern Yes     Comment: coffee and tea daily    Exercise Yes    Seat Belt Yes    Special Diet No    Stress Concern Yes    Weight Concern No       Family History:  Family History   Problem Relation Age of Onset    Heart Disorder Father         MI    Hypertension Mother     No Known Problems Sister     No Known Problems Brother     No Known  Problems Brother     No Known Problems Brother     No Known Problems Sister            ROS:  10 point ROS completed and was negative, except for pertinent positive and negatives stated in subjective.    Objective/Physical Exam:    Vital Signs:  Weight 126 lb 5.2 oz (57.3 kg).    Gen: Awake and in no apparent distress  HEENT: moist mucus membranes  Neck: Supple  Cardiovascular: Regular rate and rhythm, no murmur  Pulm: CTAB  GI: non-tender, normal bowel sounds  Skin: normal, dry  Extremities: No clubbing or cyanosis      Neurologic:   MENTAL STATUS: alert, ox3, normal attention, language and fund of knowledge.      CRANIAL NERVES II to XII: PERRLA, no ptosis or diplopia, EOM intact, facial sensation intact, strong eye closure, mild L lower facial weakness , no dysarthria, tongue midline,  no tongue fasciculations or atrophy, strong shoulder shrug.    MOTOR EXAMINATION: normal tone, no fasciculations, normal strength throughout in UEs and LEs      SENSORY EXAMINATION:  UE: intact to light touch, pinprick intact  LE: intact to light touch, pinprick intact    COORDINATION:  No dysmetria, or intention tremors     REFLEXES: 2+ at biceps, 2+ brachioradialis, 3+ at patella, 2+ at the ankles     GAIT: normal stance, normal toe gait and heel gait, tandem well.             Labs:  Component      Latest Ref Rng & Units 10/1/2022 9/30/2022   IMMUNOGLOBULIN G      768 - 1632 mg/dL  1512   IMMUNOGLOBULIN G CSF      0.0 - 6.0 mg/dL  4.4   Albumin, CSF      0 - 35 mg/dL  21   ALBUMIN, SERUM/PLASMA, NEPH      3500 - 5200 mg/dL  3695   ALBUMIN INDEX      0.0 - 9.0 ratio  5.7   CSF IGG SYNTHESIS RATE      <=8.0 mg/d  <0.0   IGG INDEX      0.28 - 0.66 ratio  0.51   CSF IGG/ALBUMIN RATIO      0.09 - 0.25 ratio  0.21   CSF OLIGOCLONAL BANDS      Negative  Negative   CSF OLIGOCLONAL BANDS NUMBER      0 - 1 Bands  0   INTERPRETATION        See Note   TOTAL VOLUME CSF      mL  8.0   CSF TUBE #        4   Color CSF      Colorless  Colorless    Clarity CSF      Clear  Clear   TNC, CSF      0 - 5 /mm3  2   RBC CSF      <1 /mm3  1 (H)   ANGIOTENSIN CONVERTING E, CSF      0.0 - 2.5 U/L  1.4   Glucose CSF      40 - 70 mg/dL  54   Total Protein CSF      15.0 - 45.0 mg/dL  43.5   AQUAPORIN-4 RECEPTOR ANTIBODY      <=2.9 U/mL  <1.5   MOG      <1:10 <1:10           Imaging:  MRI Brain 9/2022  Impression   CONCLUSION:         1. No evidence of an acute infarct.       2. Numerous subcortical FLAIR abnormalities throughout the white matter is noted.  This is greater than expected for the patient's age.  This may be due to chronic small vessel ischemic disease that is advanced.  Developing demyelinating process is a   consideration.  Focal hyperintensity on diffusion-weighted images in the left frontal lobe subcortical white matter (image 20) is likely due to T2 shine through.  An acute infarct is not identified.          MRI Cervical spine and Thoracic spine ww/o 9/2022  CONCLUSION:         4.7 cm length syrinx in the thoracic spinal cord.  No associated enhancement.  Chronicity and etiology is uncertain.  No evidence for tumor.  No cord compression or central canal stenosis in this region.       Subtle increased signal left side of the cervical spinal cord.  These could reflect subtle areas of demyelination in this patient with abnormal brain MRI with features described as suspicious for demyelination.       No signs of cord compression.  Other findings as above.       MRI/MRA Brain 12/20/23  CONCLUSION:       1. No evidence of an acute infarct.      2. Numerous FLAIR abnormalities throughout the subcortical white matter greater than expected for the patient's age.  This may be sequelae of moderate chronic small vessel ischemic disease.  Demyelinating process cannot be entirely excluded though is   considered less likely.  Sequelae of migraine headaches is of consideration.      3. Unremarkable MR angiogram head exam.      4. Overall unremarkable MR angiogram neck  exam.  On the axial T1 fat saturation images there is questionable high T1 signal at the thoracic inlet extending around the vessels.  This is not visualized on the 2D time-of-flight images.  This is likely   artifactual. Please correlate for pain along the ventral base of the patient's neck or thoracic inlet.  If the patient has pain or ecchymosis in this region a follow-up CT of neck may be done for further evaluation with contrast.     Assessment:  This is a 54 y/o female with intermittent right facial twitching.  I did not see this on exam today.  Appears to be a form of hemifacial spasm.  She states that is not painful anyway.  She has had the same facial spasms once or twice a month for several years.  I did discuss a trial of Botox injections for these.  She declines any medications or treatments.  She states that she does not want any Botox for these at this point in time.  Her most recent MRI of the brain and MRI of the brain was reviewed and is unchanged from previous examinations.  Multiple areas of T2 flair changes.  Most likely advanced microvascular disease.  Again she had extensive workups for these lesions in the past including repeat MRIs which are unchanged from initial imaging done in 2022.  She did an LP with no suggestion of multiple sclerosis at this point time.  She has no other symptoms except for the right hemifacial spasm and some intermittent burning pains in the top of the head.  She has has had all both the symptoms for several years.      Plan:  1. Abnormal MRI  - Repeat MRI 12/2023 brain reviewed and stable    2. Hemifacial spasm  - Declines any trial of botox       Augusto Gaxiola, DO  Neurology

## 2024-04-02 NOTE — PROGRESS NOTES
Pt reports she continues to get episodes of burning sensation on the top of her head on occasion.    Twitching in face remains intermittent.  Left side of face and around lips remain numb - constant.     Pt reports increased episodes of dizziness.

## 2024-04-02 NOTE — PATIENT INSTRUCTIONS
After your visit at the [facility] today,  please direct any follow up questions or medication needs to the staff in our [place] office so that your concerns may be promptly addressed.  We are available through [employer] or at the numbers below:    The phone number is:   [phone] option #1    The fax number is:  [phone]    Your pharmacy should also send any requests electronically to the [place] office.  Refill policies:    Allow 2-3 business days for refills; controlled substances may take longer.  Contact your pharmacy at least 5 days prior to running out of medication and have them send an electronic request or submit request through the “request refill” option in your [employer] account.  Refills are not addressed on weekends; covering physicians do not authorize routine medications on weekends.  No narcotics or controlled substances are refilled after noon on Fridays or by on call physicians.  By law, narcotics must be electronically prescribed.  A 30 day supply with no refills is the maximum allowed.  If your prescription is due for a refill, you may be due for a follow up appointment.  To best provide you care, patients receiving routine medications need to be seen at least once a year.  Patients receiving narcotic/controlled substance medications need to be seen at least once every 3 months.  In the event that your preferred pharmacy does not have the requested medication in stock (e.g. Backordered), it is your responsibility to find another pharmacy that has the requested medication available.  We will gladly send a new prescription to that pharmacy at your request.    Scheduling Tests:    If your physician has ordered radiology tests such as MRI or CT scans, please contact Central Scheduling at [phone] right away to schedule the test.  Once scheduled, the [facility] Centralized Referral Team will work with your insurance carrier to obtain pre-certification or prior authorization.   Depending on your insurance carrier, approval may take 3-10 days.  It is highly recommended patients assure they have received an authorization before having a test performed.  If test is done without insurance authorization, patient may be responsible for the entire amount billed.      Precertification and Prior Authorizations:  If your physician has recommended that you have a procedure or additional testing performed the [facility] Centralized Referral Team will contact your insurance carrier to obtain pre-certification or prior authorization.    You are strongly encouraged to contact your insurance carrier to verify that your procedure/test has been approved and is a COVERED benefit.  Although the [facility] Centralized Referral Team does its due diligence, the insurance carrier gives the disclaimer that \"Although the procedure is authorized, this does not guarantee payment.\"    Ultimately the patient is responsible for payment.   Thank you for your understanding in this matter.  Paperwork Completion:  If you require FMLA or disability paperwork for your recovery, please make sure to either drop it off or have it faxed to our office at [phone]. Be sure the form has your name and date of birth on it.  The form will be faxed to our Forms Department and they will complete it for you.  There is a 25$ fee for all forms that need to be filled out.  Please be aware there is a 10-14 day turnaround time.  You will need to sign a release of information (SHANE) form if your paperwork does not come with one.  You may call the Forms Department with any questions at [phone].  Their fax number is [phone].

## 2024-07-22 ENCOUNTER — OFFICE VISIT (OUTPATIENT)
Dept: FAMILY MEDICINE CLINIC | Facility: CLINIC | Age: 54
End: 2024-07-22
Payer: COMMERCIAL

## 2024-07-22 VITALS
DIASTOLIC BLOOD PRESSURE: 72 MMHG | BODY MASS INDEX: 22.26 KG/M2 | OXYGEN SATURATION: 97 % | SYSTOLIC BLOOD PRESSURE: 116 MMHG | RESPIRATION RATE: 18 BRPM | HEART RATE: 67 BPM | HEIGHT: 62 IN | WEIGHT: 121 LBS | TEMPERATURE: 97 F

## 2024-07-22 DIAGNOSIS — M75.21 BICEPS TENDONITIS ON RIGHT: ICD-10-CM

## 2024-07-22 DIAGNOSIS — S29.019A THORACIC MYOFASCIAL STRAIN, INITIAL ENCOUNTER: Primary | ICD-10-CM

## 2024-07-22 PROCEDURE — 99213 OFFICE O/P EST LOW 20 MIN: CPT | Performed by: FAMILY MEDICINE

## 2024-07-22 RX ORDER — METHYLPREDNISOLONE 4 MG/1
TABLET ORAL
Qty: 1 EACH | Refills: 0 | Status: SHIPPED | OUTPATIENT
Start: 2024-07-22

## 2024-07-22 NOTE — PATIENT INSTRUCTIONS
I discussed the diagnosis and contributing factors  Recommend moist heat up to 15 minutes 3-4 times daily followed by soft tissue massage, shoulder and scapular stretches  Patient may use ibuprofen up to 600 mg 3 times daily during the day  Medrol Dosepak  Note written for her to remain off work through tomorrow, patient may return to work if better on Wednesday.  If no significant improvement with rest and above recommendations, consider formal physical therapy.

## 2024-07-22 NOTE — PROGRESS NOTES
Courtney Allison is a 53 year old female.  Chief Complaint   Patient presents with    Shoulder Pain     Right shoulder pain. 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 90 tablet 1    diphenhydrAMINE-APAP, sleep, (TYLENOL PM EXTRA STRENGTH OR) Take 1 tablet by mouth nightly as needed (for sleep).        Past Medical History:    Allergic rhinitis, cause unspecified    COVID-19 virus infection    Genital herpes    for less than or around 6 months      Social History:  Social History     Socioeconomic History    Marital status:    Tobacco Use    Smoking status: Never    Smokeless tobacco: Never   Vaping Use    Vaping status: Never Used   Substance and Sexual Activity    Alcohol use: No    Drug use: No    Sexual activity: Not Currently     Partners: Male   Other Topics Concern    Caffeine Concern Yes     Comment: coffee and tea daily    Exercise Yes    Seat Belt Yes    Special Diet No    Stress Concern Yes    Weight Concern No     Social Determinants of Health      [facility], [facility]    Housing Stability        REVIEW OF SYSTEMS:   GENERAL HEALTH: feels well otherwise, denies fatigue, appetite ok  SKIN: denies any unusual skin lesions or rashes  ENT: denies nasal congestion, pnd, sore throat, ear pain or pressure, decreased hearing  RESPIRATORY: denies shortness of breath with exertion,cough, wheezing  CARDIOVASCULAR: denies chest pain on exertion, edema  GI: denies  abdominal pain and denies heartburn  NEURO: denies headaches  MSK: see hpi    EXAM:   /72 (BP Location: Left arm, Patient Position: Sitting, Cuff Size: adult)   Pulse 67   Temp 97.2 °F (36.2 °C) (Temporal)   Resp 18   Ht 5' 2\" (1.575 m)   Wt 121 lb (54.9 kg)   SpO2 97%   BMI 22.13 kg/m²   GENERAL: well developed, well nourished,in no apparent distress, well hydrated  SKIN: no rashes,no suspicious lesions  ENT: TMs: intact, good mobility, Nose: turbinates clear, no dc, Throat: no erythema, pnd, or lesions  NECK: supple,no adenopathy,no bruits, no thyromegaly  LUNGS: clear to auscultation, no w/r/r  CARDIO: RRR without murmur, peripheral pulses intact  MSK: Neck: Restricted right rotation compared to left, no pain with axial loading, negative Spurling sign  Right shoulder: Flexion to 170 degrees, palpable crepitance, mild discomfort with palpation over the biceps tendon, equivocal speeds test, negative Marti sign  Palpable muscular spasm right erector spinae at the inferior angle of the scapula  Neuro: Symmetrical upper extremity reflexes, sensation intact to fine touch and pinprick, no motor weakness    ASSESSMENT AND PLAN:     Encounter Diagnoses   Name Primary?    Thoracic myofascial strain, initial encounter Yes    Biceps tendonitis on right      No orders of the defined types were placed in this encounter.    Meds & Refills for this Visit:  Requested Prescriptions     Signed Prescriptions Disp Refills    methylPREDNISolone (MEDROL) 4 MG Oral Tablet Therapy Pack 1 each 0     Sig: As directed.     Imaging & Consults:  None  No follow-ups on file.  Patient Instructions   I discussed the diagnosis and contributing factors  Recommend moist heat up to 15 minutes 3-4 times daily followed by soft tissue massage, shoulder and scapular stretches  Patient may use ibuprofen up to 600 mg 3 times daily during the day  Medrol Dosepak  Note written for her to remain off work through tomorrow, patient may return to  work if better on Wednesday.  If no significant improvement with rest and above recommendations, consider formal physical therapy.   The patient indicates understanding of these issues and agrees to the plan.    Masoud Muñoz, , FAAFP

## 2024-09-12 ENCOUNTER — TELEPHONE (OUTPATIENT)
Dept: FAMILY MEDICINE CLINIC | Facility: CLINIC | Age: 54
End: 2024-09-12

## 2024-09-12 NOTE — TELEPHONE ENCOUNTER
PATIENT THINKS SHE MAY BE HAVING AN ALLERGIC REACTION, HER LIPS ARE SWELLING, SHE DID EAT SOME TUNA & RICE

## 2024-09-12 NOTE — TELEPHONE ENCOUNTER
Spoke with patient who states she just finished eating Tuna and Rice, and the left side of her lips are swollen. She denies any shortness of breath. This nurse did speak with a covering provider, Polly Griffin, who states that patient can take 1 25 mg tablet of Benadryl. If the symptoms do not improve or get worse, then she needs to go to the ER for evaluation. Patient was alert and oriented at time of the call and verbalized understanding. This nurse did attempt to initiate the triage protocol, but there was no protocol for lip swelling or food reaction in an adult.

## 2024-11-21 ENCOUNTER — HOSPITAL ENCOUNTER (OUTPATIENT)
Age: 54
Discharge: HOME | End: 2024-11-21
Payer: COMMERCIAL

## 2024-11-21 VITALS
DIASTOLIC BLOOD PRESSURE: 62 MMHG | OXYGEN SATURATION: 98 % | TEMPERATURE: 98 F | BODY MASS INDEX: 21.53 KG/M2 | RESPIRATION RATE: 18 BRPM | HEART RATE: 72 BPM | WEIGHT: 117 LBS | HEIGHT: 62 IN | SYSTOLIC BLOOD PRESSURE: 132 MMHG

## 2024-11-21 DIAGNOSIS — M54.32 SCIATICA OF LEFT SIDE: Primary | ICD-10-CM

## 2024-11-21 PROCEDURE — 99204 OFFICE O/P NEW MOD 45 MIN: CPT | Performed by: NURSE PRACTITIONER

## 2024-11-21 RX ORDER — ORPHENADRINE CITRATE 100 MG/1
100 TABLET ORAL 2 TIMES DAILY
Qty: 20 EACH | Refills: 0 | Status: SHIPPED | OUTPATIENT
Start: 2024-11-21 | End: 2024-12-01

## 2024-11-21 RX ORDER — METHYLPREDNISOLONE 4 MG/1
TABLET ORAL
Qty: 1 EACH | Refills: 0 | Status: SHIPPED | OUTPATIENT
Start: 2024-11-21

## 2024-11-22 NOTE — ED PROVIDER NOTES
Patient Seen in: [facility]      History     Chief Complaint   Patient presents with    Abdomen/Flank Pain    Leg or Foot Injury     Stated Complaint: left abd and leg pains    Subjective:   53 yo female presents to the immediate care with c/o with left back pain.  Patient states she has been having this pain intermittently for several months.  She saw her PCP for this a while back and an X-ray was ordered.  She never got the imaging done because she states she \"hasn't had time.\"   This most recent episode started yesterday.  Pain is to her left lower back and radiates into her buttock and leg.  She has had some tingling in her toes today.  She took Advil 400 mg twice yesterday for  pain, but has not taken anything for pain today.  She denies any numbness, saddle paresthesias, weakness, or loss of bowel or bladder control.      The history is provided by the patient.         Objective:     Past Medical History:    Allergic rhinitis, cause unspecified    COVID-19 virus infection    Genital herpes    for less than or around 6 months              Past Surgical History:   Procedure Laterality Date          X2    Ligate fallopian tube                  No pertinent social history.            Review of Systems   Constitutional: Negative.    Musculoskeletal:  Positive for back pain and myalgias.   All other systems reviewed and are negative.      Positive for stated complaint: left abd and leg pains  Other systems are as noted in HPI.  Constitutional and vital signs reviewed.      All other systems reviewed and negative except as noted above.    Physical Exam     ED Triage Vitals   BP 24 1758 132/62   Pulse 24 1758 72   Resp 24 1758 18   Temp 24 1758 97.6 °F (36.4 °C)   Temp src --    SpO2 24 175 98 %   O2 Device 24 None (Room air)       Current Vitals:   Vital Signs  BP: 132/62  Pulse: 72  Resp: 18  Temp: 97.6 °F (36.4 °C)    Oxygen Therapy  SpO2: 98 %  O2  Device: None (Room air)        Physical Exam  Vitals and nursing note reviewed.   Constitutional:       General: She is not in acute distress.     Appearance: Normal appearance. She is well-developed and normal weight. She is not ill-appearing.   HENT:      Head: Normocephalic and atraumatic.      Nose: Nose normal.      Mouth/Throat:      Mouth: Mucous membranes are moist.      Pharynx: Oropharynx is clear.   Eyes:      Conjunctiva/sclera: Conjunctivae normal.   Cardiovascular:      Rate and Rhythm: Normal rate and regular rhythm.      Pulses: Normal pulses.      Heart sounds: Normal heart sounds.   Pulmonary:      Effort: Pulmonary effort is normal. No respiratory distress.      Breath sounds: Normal breath sounds.   Musculoskeletal:         General: Tenderness present. No swelling, deformity or signs of injury. Normal range of motion.      Comments: Tenderness with palpation of the left lumbar paraspinal muscles, left SI joint and piriformis muscle.  Moderate spasm present.  ROM, motor strength and sensation intact.  Dorsiflexion and plantar flexion are equal 5/5 bilaterally.  Positive SLR at 20 degrees to the left.     Skin:     General: Skin is warm and dry.      Capillary Refill: Capillary refill takes less than 2 seconds.   Neurological:      General: No focal deficit present.      Mental Status: She is alert and oriented to person, place, and time.   Psychiatric:         Mood and Affect: Mood normal.         Behavior: Behavior normal.           ED Course   Labs Reviewed - No data to display       MDM          Medical Decision Making  55 yo female with hx. And exam consistent with lumbar strain with radiculopathy.  Do not feel that any emergent imaging is necessary at this time.  Will treat patient medically.  She can take Ibuprofen or Tylenol for pain.  A muscle relaxer and Medrol dose pack prescribed for stiffness and radiculopathy.  No evidence of fracture, acute cord compression, cauda equina, or  neurovascular compromise.  Recommended supportive management for home.  Patient to follow up with PCP for possible PT referral.     Risk  OTC drugs.  Prescription drug management.        Disposition and Plan     Clinical Impression:  1. Sciatica of left side         Disposition:  Discharge  11/21/2024  6:15 pm    Follow-up:  Masoud Muñoz, DO  [address] [facility] [address]  [phone]    In 1 week  As needed          Medications Prescribed:  Current Discharge Medication List        START taking these medications    Details   methylPREDNISolone (MEDROL) 4 MG Oral Tablet Therapy Pack Dosepack: take as directed  Qty: 1 each, Refills: 0      orphenadrine  MG Oral Tablet 12 Hr Take 100 mg by mouth 2 (two) times daily for 10 days.  Qty: 20 each, Refills: 0                 Supplementary Documentation:

## 2024-11-22 NOTE — DISCHARGE INSTRUCTIONS
Rest and drink plenty of fluids.   Apply ice 20 minutes on and then 40 minutes off several times a day.  Alternate ice with warm, moist compresses.   Take Tylenol and/or ibuprofen as needed for pain.   Use the muscle relaxer for muscle stiffness or soreness.   Start the Medrol dose pack as prescribed tomorrow.  Make sure to take it with food.   After the acute pain improves, start with light stretching or yoga to help prevent further injury.   Follow up with your PCP in 5-7 days.     Thank you for choosing [facility] for your care.

## 2025-02-18 RX ORDER — VALACYCLOVIR HYDROCHLORIDE 500 MG/1
500 TABLET, FILM COATED ORAL DAILY
Qty: 30 TABLET | Refills: 0 | Status: SHIPPED | OUTPATIENT
Start: 2025-02-18

## 2025-02-18 NOTE — TELEPHONE ENCOUNTER
No protocol    OV 07/22/24 acute  REFILL 03/05/24 #90 +1 RF    No future appointments.    [facility]

## 2025-04-08 RX ORDER — VALACYCLOVIR HYDROCHLORIDE 500 MG/1
500 TABLET, FILM COATED ORAL DAILY
Qty: 90 TABLET | Refills: 0 | Status: SHIPPED | OUTPATIENT
Start: 2025-04-08

## 2025-04-08 NOTE — TELEPHONE ENCOUNTER
Last office visit: 7/22/24    No future appointments.    Last filled: 2/18/25    Last labs: 12/20/23

## 2025-08-07 RX ORDER — VALACYCLOVIR HYDROCHLORIDE 500 MG/1
500 TABLET, FILM COATED ORAL DAILY
Qty: 90 TABLET | Refills: 0 | Status: SHIPPED | OUTPATIENT
Start: 2025-08-07

## (undated) NOTE — LETTER
Date & Time: 12/20/2023, 3:54 PM  Patient: Wayne Walton  Encounter Provider(s):    Brandee Diehl MD       To Whom It May Concern:    Trevon Peacock was seen and treated in our department on 12/20/2023. She may not return to work until 12/24 without restrictions.     If you have any questions or concerns, please do not hesitate to call.        _____________________________  Physician/APC Signature

## (undated) NOTE — Clinical Note
Hello!  I had the pleasure of seeing your patient, Delisa Oakes, today in the office and have routed the progress note to you for your review. Thank you for the referral and please contact me if you have any questions.     Sincerely,    Ran Finley

## (undated) NOTE — LETTER
[facility], [address] [address]  [phone]          05/14/20    Charlie Kirkland  10/9/1970    Attn: Vibha Weiss  Fax [phone]    Rianna Gonzalez has fully recovered from her COVID-

## (undated) NOTE — LETTER
06/01/23    Lois Allison      To Whom It May Concern: This letter has been written at the patient's request. The above patient was seen at [facility] for treatment of a medical condition from 5/30-6/01    The patient may return to work/school on 6/05/23.       Sincerely,        Sophia Cavazos RN  06/01/23, 5:01 PM

## (undated) NOTE — LETTER
Date: 4/14/2020    Patient Name: Charlie Kirkland          To Whom it may concern: This letter has been written at the patient's request. The above patient was seen at the [facility] for treatment of a medical condition.     This patient estela

## (undated) NOTE — MR AVS SNAPSHOT
[facility]  [address] [address]  [phone]               Thank you for choosing us for your health care visit with Joi Goff DO.   We are glad to serve you and happy to provide you with this summ Information about where to get these medications is not yet available     !  Ask your nurse or doctor about these medications    - Budesonide-Formoterol Fumarate 160-4.5 MCG/ACT Aero            MyChart     Call the helpdesk for assistance with your inactive

## (undated) NOTE — LETTER
[address] [facility] [address] [address]  [phone]          09/29/20    Roma Nichols  10/9/1970    Attention : Tamar Diaz  Fax [phone]    Mrs. Pastora Urbano currently has symptoms

## (undated) NOTE — LETTER
Date: 7/22/2024    Patient Name: Courtney Allison          To Whom it may concern:    This letter has been written at the patient's request. The above patient was seen at [facility] for treatment of a medical condition.    This patient should be excused from attending work from 7/22 through 7/23.    The patient may return to work on 7/24.        Sincerely,    Masoud Muñoz, DO

## (undated) NOTE — LETTER
10/02/22    Dana Allison      To Whom It May Concern: This letter has been written at the patient's request. The above patient was seen at [facility] for treatment of a medical condition from 9/29/22-10/2/22. The patient may return to work/school on Thursday 10/6/22 with the following limitations: none.       Sincerely,        Candelario Degroot RN  10/02/22, 3:01 PM

## (undated) NOTE — LETTER
06/01/23    Marcia Allison      To Whom It May Concern:     This letter has been written at the patient's request. The above patient was seen at [facility] for treatment of a medical condition from 5/30-6/01    The patient may return to work/school on 06/05/2023      Sincerely,        Lance Mancilla MD  06/01/23, 5:08 PM

## (undated) NOTE — LETTER
Date & Time: 11/21/2024, 6:16 PM  Patient: Courtney Allison  Encounter Provider(s):    Mary Gomez APRN       To Whom It May Concern:    Courtney Allison was seen and treated in our department on 11/21/2024. She should not return to work until 11/23/24 .    If you have any questions or concerns, please do not hesitate to call.      Electronically signed by LARRY Shirley    _____________________________  Physician/APC Signature

## (undated) NOTE — LETTER
[address] [facility] [address] [id number]  [phone]          04/30/20     Cosme Rodrigues  10/9/1970    To whom it may concern,    Mrs. Johnny Roy has symptoms of an upper respiratory viru

## (undated) NOTE — LETTER
Patient Name: Jose Luis Roman  : 10/9/1970  MRN: [id number]  Patient Address: [address] [place] [address]      Coronavirus Disease 2019 (COVID-19)     [facility] is committed to the safety and well-being of our patients, members, e carefully. If your symptoms get worse, call your healthcare provider immediately. 3. Get rest and stay hydrated.    4. If you have a medical appointment, call the healthcare provider ahead of time and tell them that you have or may have COVID-19.  5. For m of fever-reducing medications; and  · Improvement in respiratory symptoms (e.g., cough, shortness of breath); and  · At least 10 days have passed since symptoms first appeared OR if asymptomatic patient or date of symptom onset is unclear then use 10 days donors must:    · Have had a confirmed diagnosis of COVID-19  · Be symptom-free for at least 14 days*    *Some people will be required to have a repeat COVID-19 test in order to be eligible to donate.  If you’re instructed by Jesus that a repeat test is r random. Researchers are trying to identify similarities between people with a Post-COVID condition to better understand if there are risk factors. How do I prevent a Post-COVID condition?   The best way to prevent the long-term symptoms of COVID-19 is

## (undated) NOTE — LETTER
Date & Time: 12/20/2023, 3:41 PM  Patient: Hiram Joyner  Encounter Provider(s):    Kristy Guerrero MD       To Whom It May Concern:    Rayna Nagy was seen and treated in our department on 12/20/2023. She should not return to school until 12/24 .     If you have any questions or concerns, please do not hesitate to call.        _____________________________  Physician/APC Signature

## (undated) NOTE — LETTER
[address] [id number]  [phone]          05/04/20    Alexus Clubs  10/9/1970    To whom it may concern,    Mrs. Allison tested positive for COVID-19 on 4/30/2020.

## (undated) NOTE — MR AVS SNAPSHOT
[facility]  [address] [address]  [phone]               Thank you for choosing us for your health care visit with Ryan Perkins. Laurence Loredo DO.   We are glad to serve you and happy to provide you with this sum Phone:  [phone]    - Amoxicillin-Pot Clavulanate 875-125 MG Tabs  - benzonatate 200 MG Caps  - predniSONE 20 MG Tabs            MyChart     Call the [employer] for assistance with your inactive [employer] account    If you have questions, you can call (6